# Patient Record
Sex: FEMALE | Race: WHITE | NOT HISPANIC OR LATINO | Employment: OTHER | URBAN - METROPOLITAN AREA
[De-identification: names, ages, dates, MRNs, and addresses within clinical notes are randomized per-mention and may not be internally consistent; named-entity substitution may affect disease eponyms.]

---

## 2018-08-13 ENCOUNTER — ANESTHESIA (OUTPATIENT)
Dept: PERIOP | Facility: HOSPITAL | Age: 78
End: 2018-08-13

## 2018-08-13 ENCOUNTER — HOSPITAL ENCOUNTER (OUTPATIENT)
Facility: HOSPITAL | Age: 78
Setting detail: HOSPITAL OUTPATIENT SURGERY
Discharge: HOME | End: 2018-08-13
Attending: SURGERY | Admitting: SURGERY

## 2018-08-13 ENCOUNTER — ANESTHESIA EVENT (OUTPATIENT)
Dept: PERIOP | Facility: HOSPITAL | Age: 78
End: 2018-08-13

## 2018-08-13 VITALS
RESPIRATION RATE: 16 BRPM | DIASTOLIC BLOOD PRESSURE: 41 MMHG | OXYGEN SATURATION: 99 % | TEMPERATURE: 97.1 F | HEART RATE: 73 BPM | HEIGHT: 63 IN | SYSTOLIC BLOOD PRESSURE: 163 MMHG | BODY MASS INDEX: 38.27 KG/M2 | WEIGHT: 216 LBS

## 2018-08-13 LAB
ANION GAP SERPL CALCULATED.3IONS-SCNC: 13 MMOL/L (ref 3–11)
BUN BLD-MCNC: 73 MG/DL (ref 9–23)
BUN/CREAT SERPL: 18.6 (ref 7–25)
CALCIUM SPEC-SCNC: 9.2 MG/DL (ref 8.7–10.4)
CHLORIDE SERPL-SCNC: 108 MMOL/L (ref 99–109)
CO2 SERPL-SCNC: 18 MMOL/L (ref 20–31)
CREAT BLD-MCNC: 3.92 MG/DL (ref 0.6–1.3)
DEPRECATED RDW RBC AUTO: 41.6 FL (ref 37–54)
ERYTHROCYTE [DISTWIDTH] IN BLOOD BY AUTOMATED COUNT: 12.2 % (ref 11.3–14.5)
GFR SERPL CREATININE-BSD FRML MDRD: 11 ML/MIN/1.73
GLUCOSE BLD-MCNC: 101 MG/DL (ref 70–100)
GLUCOSE BLDC GLUCOMTR-MCNC: 103 MG/DL (ref 70–130)
HCT VFR BLD AUTO: 35.6 % (ref 34.5–44)
HGB BLD-MCNC: 11.7 G/DL (ref 11.5–15.5)
MCH RBC QN AUTO: 31 PG (ref 27–31)
MCHC RBC AUTO-ENTMCNC: 32.9 G/DL (ref 32–36)
MCV RBC AUTO: 94.4 FL (ref 80–99)
PLATELET # BLD AUTO: 252 10*3/MM3 (ref 150–450)
PMV BLD AUTO: 10.1 FL (ref 6–12)
POTASSIUM BLD-SCNC: 4.1 MMOL/L (ref 3.5–5.5)
RBC # BLD AUTO: 3.77 10*6/MM3 (ref 3.89–5.14)
SODIUM BLD-SCNC: 139 MMOL/L (ref 132–146)
WBC NRBC COR # BLD: 6.58 10*3/MM3 (ref 3.5–10.8)

## 2018-08-13 PROCEDURE — 25010000002 HEPARIN (PORCINE) PER 1000 UNITS: Performed by: SURGERY

## 2018-08-13 PROCEDURE — 82962 GLUCOSE BLOOD TEST: CPT

## 2018-08-13 PROCEDURE — 25010000002 PROPOFOL 10 MG/ML EMULSION: Performed by: NURSE ANESTHETIST, CERTIFIED REGISTERED

## 2018-08-13 PROCEDURE — C1750 CATH, HEMODIALYSIS,LONG-TERM: HCPCS | Performed by: SURGERY

## 2018-08-13 PROCEDURE — 25010000002 FENTANYL CITRATE (PF) 100 MCG/2ML SOLUTION: Performed by: NURSE ANESTHETIST, CERTIFIED REGISTERED

## 2018-08-13 PROCEDURE — 93010 ELECTROCARDIOGRAM REPORT: CPT | Performed by: INTERNAL MEDICINE

## 2018-08-13 PROCEDURE — 25010000002 NEOSTIGMINE 10 MG/10ML SOLUTION: Performed by: NURSE ANESTHETIST, CERTIFIED REGISTERED

## 2018-08-13 PROCEDURE — 80048 BASIC METABOLIC PNL TOTAL CA: CPT | Performed by: ANESTHESIOLOGY

## 2018-08-13 PROCEDURE — 85027 COMPLETE CBC AUTOMATED: CPT | Performed by: ANESTHESIOLOGY

## 2018-08-13 PROCEDURE — 93005 ELECTROCARDIOGRAM TRACING: CPT | Performed by: ANESTHESIOLOGY

## 2018-08-13 PROCEDURE — 25010000002 ONDANSETRON PER 1 MG: Performed by: NURSE ANESTHETIST, CERTIFIED REGISTERED

## 2018-08-13 PROCEDURE — 25010000002 DEXAMETHASONE PER 1 MG: Performed by: NURSE ANESTHETIST, CERTIFIED REGISTERED

## 2018-08-13 DEVICE — KT CATH DIAL PERITONEAL CURL 2CUFF 62CM: Type: IMPLANTABLE DEVICE | Site: PERITONEUM | Status: FUNCTIONAL

## 2018-08-13 RX ORDER — SODIUM CHLORIDE, SODIUM LACTATE, POTASSIUM CHLORIDE, CALCIUM CHLORIDE 600; 310; 30; 20 MG/100ML; MG/100ML; MG/100ML; MG/100ML
INJECTION, SOLUTION INTRAVENOUS CONTINUOUS PRN
Status: DISCONTINUED | OUTPATIENT
Start: 2018-08-13 | End: 2018-08-13

## 2018-08-13 RX ORDER — FENTANYL CITRATE 50 UG/ML
INJECTION, SOLUTION INTRAMUSCULAR; INTRAVENOUS AS NEEDED
Status: DISCONTINUED | OUTPATIENT
Start: 2018-08-13 | End: 2018-08-13 | Stop reason: SURG

## 2018-08-13 RX ORDER — ATRACURIUM BESYLATE 10 MG/ML
INJECTION, SOLUTION INTRAVENOUS AS NEEDED
Status: DISCONTINUED | OUTPATIENT
Start: 2018-08-13 | End: 2018-08-13 | Stop reason: SURG

## 2018-08-13 RX ORDER — MAGNESIUM HYDROXIDE 1200 MG/15ML
LIQUID ORAL AS NEEDED
Status: DISCONTINUED | OUTPATIENT
Start: 2018-08-13 | End: 2018-08-13 | Stop reason: HOSPADM

## 2018-08-13 RX ORDER — PROMETHAZINE HYDROCHLORIDE 25 MG/1
25 SUPPOSITORY RECTAL ONCE AS NEEDED
Status: DISCONTINUED | OUTPATIENT
Start: 2018-08-13 | End: 2018-08-13 | Stop reason: HOSPADM

## 2018-08-13 RX ORDER — SODIUM CHLORIDE 0.9 % (FLUSH) 0.9 %
1-10 SYRINGE (ML) INJECTION AS NEEDED
Status: DISCONTINUED | OUTPATIENT
Start: 2018-08-13 | End: 2018-08-13 | Stop reason: HOSPADM

## 2018-08-13 RX ORDER — LABETALOL HYDROCHLORIDE 5 MG/ML
5 INJECTION, SOLUTION INTRAVENOUS
Status: DISCONTINUED | OUTPATIENT
Start: 2018-08-13 | End: 2018-08-13 | Stop reason: HOSPADM

## 2018-08-13 RX ORDER — LIDOCAINE HYDROCHLORIDE 10 MG/ML
INJECTION, SOLUTION EPIDURAL; INFILTRATION; INTRACAUDAL; PERINEURAL AS NEEDED
Status: DISCONTINUED | OUTPATIENT
Start: 2018-08-13 | End: 2018-08-13 | Stop reason: SURG

## 2018-08-13 RX ORDER — PROMETHAZINE HYDROCHLORIDE 25 MG/1
25 TABLET ORAL ONCE AS NEEDED
Status: DISCONTINUED | OUTPATIENT
Start: 2018-08-13 | End: 2018-08-13 | Stop reason: HOSPADM

## 2018-08-13 RX ORDER — BUPIVACAINE HYDROCHLORIDE 5 MG/ML
INJECTION, SOLUTION PERINEURAL AS NEEDED
Status: DISCONTINUED | OUTPATIENT
Start: 2018-08-13 | End: 2018-08-13 | Stop reason: HOSPADM

## 2018-08-13 RX ORDER — ONDANSETRON 2 MG/ML
INJECTION INTRAMUSCULAR; INTRAVENOUS AS NEEDED
Status: DISCONTINUED | OUTPATIENT
Start: 2018-08-13 | End: 2018-08-13 | Stop reason: SURG

## 2018-08-13 RX ORDER — PROMETHAZINE HYDROCHLORIDE 25 MG/ML
6.25 INJECTION, SOLUTION INTRAMUSCULAR; INTRAVENOUS ONCE AS NEEDED
Status: DISCONTINUED | OUTPATIENT
Start: 2018-08-13 | End: 2018-08-13 | Stop reason: HOSPADM

## 2018-08-13 RX ORDER — FAMOTIDINE 10 MG/ML
20 INJECTION, SOLUTION INTRAVENOUS ONCE
Status: DISCONTINUED | OUTPATIENT
Start: 2018-08-13 | End: 2018-08-13

## 2018-08-13 RX ORDER — FAMOTIDINE 20 MG/1
20 TABLET, FILM COATED ORAL ONCE
Status: COMPLETED | OUTPATIENT
Start: 2018-08-13 | End: 2018-08-13

## 2018-08-13 RX ORDER — IPRATROPIUM BROMIDE AND ALBUTEROL SULFATE 2.5; .5 MG/3ML; MG/3ML
3 SOLUTION RESPIRATORY (INHALATION) ONCE AS NEEDED
Status: DISCONTINUED | OUTPATIENT
Start: 2018-08-13 | End: 2018-08-13 | Stop reason: HOSPADM

## 2018-08-13 RX ORDER — GLYCOPYRROLATE 0.2 MG/ML
INJECTION INTRAMUSCULAR; INTRAVENOUS AS NEEDED
Status: DISCONTINUED | OUTPATIENT
Start: 2018-08-13 | End: 2018-08-13 | Stop reason: SURG

## 2018-08-13 RX ORDER — AMLODIPINE BESYLATE 10 MG/1
10 TABLET ORAL DAILY
COMMUNITY

## 2018-08-13 RX ORDER — TRAMADOL HYDROCHLORIDE 50 MG/1
50 TABLET ORAL EVERY 6 HOURS PRN
Qty: 15 TABLET | Refills: 0 | Status: ON HOLD | OUTPATIENT
Start: 2018-08-13 | End: 2018-10-03

## 2018-08-13 RX ORDER — HYDROMORPHONE HYDROCHLORIDE 1 MG/ML
0.5 INJECTION, SOLUTION INTRAMUSCULAR; INTRAVENOUS; SUBCUTANEOUS
Status: DISCONTINUED | OUTPATIENT
Start: 2018-08-13 | End: 2018-08-13 | Stop reason: HOSPADM

## 2018-08-13 RX ORDER — SODIUM CHLORIDE, SODIUM LACTATE, POTASSIUM CHLORIDE, CALCIUM CHLORIDE 600; 310; 30; 20 MG/100ML; MG/100ML; MG/100ML; MG/100ML
9 INJECTION, SOLUTION INTRAVENOUS CONTINUOUS
Status: DISCONTINUED | OUTPATIENT
Start: 2018-08-13 | End: 2018-08-13

## 2018-08-13 RX ORDER — HYDROCODONE BITARTRATE AND ACETAMINOPHEN 7.5; 325 MG/1; MG/1
1 TABLET ORAL ONCE AS NEEDED
Status: DISCONTINUED | OUTPATIENT
Start: 2018-08-13 | End: 2018-08-13 | Stop reason: HOSPADM

## 2018-08-13 RX ORDER — CEFAZOLIN SODIUM 2 G/100ML
2 INJECTION, SOLUTION INTRAVENOUS ONCE
Status: DISCONTINUED | OUTPATIENT
Start: 2018-08-13 | End: 2018-08-13

## 2018-08-13 RX ORDER — ASPIRIN 81 MG/1
81 TABLET ORAL DAILY
COMMUNITY

## 2018-08-13 RX ORDER — DOXAZOSIN MESYLATE 4 MG/1
4 TABLET ORAL NIGHTLY
COMMUNITY

## 2018-08-13 RX ORDER — DEXAMETHASONE SODIUM PHOSPHATE 4 MG/ML
INJECTION, SOLUTION INTRA-ARTICULAR; INTRALESIONAL; INTRAMUSCULAR; INTRAVENOUS; SOFT TISSUE AS NEEDED
Status: DISCONTINUED | OUTPATIENT
Start: 2018-08-13 | End: 2018-08-13 | Stop reason: SURG

## 2018-08-13 RX ORDER — FENTANYL CITRATE 50 UG/ML
50 INJECTION, SOLUTION INTRAMUSCULAR; INTRAVENOUS
Status: DISCONTINUED | OUTPATIENT
Start: 2018-08-13 | End: 2018-08-13 | Stop reason: HOSPADM

## 2018-08-13 RX ORDER — HYDROCHLOROTHIAZIDE 25 MG/1
25 TABLET ORAL DAILY
COMMUNITY

## 2018-08-13 RX ORDER — LIDOCAINE HYDROCHLORIDE 10 MG/ML
0.5 INJECTION, SOLUTION EPIDURAL; INFILTRATION; INTRACAUDAL; PERINEURAL ONCE AS NEEDED
Status: COMPLETED | OUTPATIENT
Start: 2018-08-13 | End: 2018-08-13

## 2018-08-13 RX ORDER — NALOXONE HCL 0.4 MG/ML
0.4 VIAL (ML) INJECTION AS NEEDED
Status: DISCONTINUED | OUTPATIENT
Start: 2018-08-13 | End: 2018-08-13 | Stop reason: HOSPADM

## 2018-08-13 RX ORDER — FENOFIBRATE 145 MG/1
145 TABLET, COATED ORAL DAILY
COMMUNITY

## 2018-08-13 RX ORDER — ONDANSETRON 2 MG/ML
4 INJECTION INTRAMUSCULAR; INTRAVENOUS ONCE AS NEEDED
Status: DISCONTINUED | OUTPATIENT
Start: 2018-08-13 | End: 2018-08-13 | Stop reason: HOSPADM

## 2018-08-13 RX ORDER — HYDROXYCHLOROQUINE SULFATE 200 MG/1
200 TABLET, FILM COATED ORAL 2 TIMES DAILY
COMMUNITY

## 2018-08-13 RX ORDER — CLINDAMYCIN PHOSPHATE 600 MG/50ML
600 INJECTION INTRAVENOUS ONCE
Status: COMPLETED | OUTPATIENT
Start: 2018-08-13 | End: 2018-08-13

## 2018-08-13 RX ORDER — HYDROCODONE BITARTRATE AND ACETAMINOPHEN 5; 325 MG/1; MG/1
1 TABLET ORAL ONCE AS NEEDED
Status: DISCONTINUED | OUTPATIENT
Start: 2018-08-13 | End: 2018-08-13 | Stop reason: HOSPADM

## 2018-08-13 RX ORDER — PROPOFOL 10 MG/ML
VIAL (ML) INTRAVENOUS AS NEEDED
Status: DISCONTINUED | OUTPATIENT
Start: 2018-08-13 | End: 2018-08-13 | Stop reason: SURG

## 2018-08-13 RX ORDER — MEPERIDINE HYDROCHLORIDE 25 MG/ML
12.5 INJECTION INTRAMUSCULAR; INTRAVENOUS; SUBCUTANEOUS
Status: DISCONTINUED | OUTPATIENT
Start: 2018-08-13 | End: 2018-08-13 | Stop reason: HOSPADM

## 2018-08-13 RX ORDER — NEOSTIGMINE METHYLSULFATE 1 MG/ML
INJECTION, SOLUTION INTRAVENOUS AS NEEDED
Status: DISCONTINUED | OUTPATIENT
Start: 2018-08-13 | End: 2018-08-13 | Stop reason: SURG

## 2018-08-13 RX ORDER — SODIUM CHLORIDE 9 MG/ML
9 INJECTION, SOLUTION INTRAVENOUS CONTINUOUS
Status: DISCONTINUED | OUTPATIENT
Start: 2018-08-13 | End: 2018-08-13 | Stop reason: HOSPADM

## 2018-08-13 RX ADMIN — SODIUM CHLORIDE 9 ML/HR: 9 INJECTION, SOLUTION INTRAVENOUS at 11:14

## 2018-08-13 RX ADMIN — SODIUM CHLORIDE: 9 INJECTION, SOLUTION INTRAVENOUS at 12:34

## 2018-08-13 RX ADMIN — ONDANSETRON 4 MG: 2 INJECTION INTRAMUSCULAR; INTRAVENOUS at 12:55

## 2018-08-13 RX ADMIN — EPHEDRINE SULFATE 5 MG: 50 INJECTION INTRAMUSCULAR; INTRAVENOUS; SUBCUTANEOUS at 12:53

## 2018-08-13 RX ADMIN — GLYCOPYRROLATE 0.2 MG: 0.2 INJECTION, SOLUTION INTRAMUSCULAR; INTRAVENOUS at 13:04

## 2018-08-13 RX ADMIN — LIDOCAINE HYDROCHLORIDE 0.3 ML: 10 INJECTION, SOLUTION EPIDURAL; INFILTRATION; INTRACAUDAL; PERINEURAL at 11:14

## 2018-08-13 RX ADMIN — FENTANYL CITRATE 25 MCG: 50 INJECTION, SOLUTION INTRAMUSCULAR; INTRAVENOUS at 12:41

## 2018-08-13 RX ADMIN — DEXAMETHASONE SODIUM PHOSPHATE 4 MG: 4 INJECTION, SOLUTION INTRAMUSCULAR; INTRAVENOUS at 12:48

## 2018-08-13 RX ADMIN — GLYCOPYRROLATE 0.2 MG: 0.2 INJECTION, SOLUTION INTRAMUSCULAR; INTRAVENOUS at 12:54

## 2018-08-13 RX ADMIN — ATRACURIUM BESYLATE 30 MG: 10 INJECTION, SOLUTION INTRAVENOUS at 12:41

## 2018-08-13 RX ADMIN — PROPOFOL 120 MG: 10 INJECTION, EMULSION INTRAVENOUS at 12:41

## 2018-08-13 RX ADMIN — GLYCOPYRROLATE 0.2 MG: 0.2 INJECTION, SOLUTION INTRAMUSCULAR; INTRAVENOUS at 13:12

## 2018-08-13 RX ADMIN — LIDOCAINE HYDROCHLORIDE 30 MG: 10 INJECTION, SOLUTION EPIDURAL; INFILTRATION; INTRACAUDAL; PERINEURAL at 12:41

## 2018-08-13 RX ADMIN — NEOSTIGMINE METHYLSULFATE 2.5 MG: 1 INJECTION, SOLUTION INTRAVENOUS at 13:04

## 2018-08-13 RX ADMIN — FAMOTIDINE 20 MG: 20 TABLET ORAL at 11:14

## 2018-08-13 RX ADMIN — NEOSTIGMINE METHYLSULFATE 1 MG: 1 INJECTION, SOLUTION INTRAVENOUS at 13:12

## 2018-08-13 RX ADMIN — CLINDAMYCIN PHOSPHATE 600 MG: 12 INJECTION, SOLUTION INTRAVENOUS at 12:44

## 2018-08-13 NOTE — H&P
"  Patient Care Team:      Chief complaint  Preparing for dialysis in the future    Subjective:    Patient is a 78 y.o.female presents for peritoneal dialysis catheter.  She has not been on dialysis yet.     Review of Systems:  General ROS: has had pedal edema, had previous foot surgery, both legs swell throughout the day  Cardiovascular ROS: positive for - dyspnea on exertion, some chest tightness with walking, is unable to walk much due to back pain and chest tightness  Respiratory ROS: positive for - shortness of breath      Allergies:   Allergies   Allergen Reactions   • Penicillins Anaphylaxis   • Sulfa Antibiotics Unknown (See Comments)     PT STATES IT \"MAKES HER SICK\"           Latex: neg neg  Contrast Dyeneg    Home Meds    Prescriptions Prior to Admission   Medication Sig Dispense Refill Last Dose   • amLODIPine (NORVASC) 10 MG tablet Take 10 mg by mouth Daily.   8/12/2018 at 1000   • aspirin 81 MG EC tablet Take 81 mg by mouth Daily.   8/13/2018 at 0530   • Cholecalciferol (VITAMIN D3) 5000 units capsule capsule Take 5,000 Units by mouth Daily.   8/12/2018 at 1000   • doxazosin (CARDURA) 4 MG tablet Take 4 mg by mouth Every Night.   8/12/2018 at 1800   • estrogens, conjugated, (PREMARIN) 0.625 MG tablet Take 0.625 mg by mouth Daily. Take daily for 21 days then do not take for 7 days.   8/12/2018 at 1000   • fenofibrate (TRICOR) 145 MG tablet Take 145 mg by mouth Daily.   8/12/2018 at 1000   • hydrochlorothiazide (HYDRODIURIL) 25 MG tablet Take 25 mg by mouth Daily.   8/12/2018 at 1000   • hydroxychloroquine (PLAQUENIL) 200 MG tablet Take 200 mg by mouth 2 (Two) Times a Day.   8/12/2018 at 1800   • insulin regular (humuLIN R,novoLIN R) 100 UNIT/ML injection Inject  under the skin into the appropriate area as directed 3 (Three) Times a Day Before Meals.        PMH:   Past Medical History:   Diagnosis Date   • Diabetes mellitus (CMS/HCC)    • Hypertension      PSH:    Past Surgical History:   Procedure " "Laterality Date   • ANKLE FUSION     • CATARACT EXTRACTION     • HYSTERECTOMY       Immunization History: pneumo up to date   Flu  2017  Tetanus  allergic  Social History:   Tobacco quit 50 years   Alcohol neg      Physical Exam:BP (!) 182/65 (BP Location: Right arm, Patient Position: Lying)   Pulse 69   Temp 97.8 °F (36.6 °C) (Temporal Artery )   Resp 18   Ht 160 cm (63\")   Wt 98 kg (216 lb)   SpO2 99%   BMI 38.26 kg/m²       General Appearance:    Alert, cooperative, no distress, appears stated age   Head:    Normocephalic, without obvious abnormality, atraumatic   Lungs:     Clear to auscultation bilaterally, respirations unlabored    Heart: Regular rate and rhythm, S1 and S2 normal, 2/6 systolic murmur, cannot appreciate radiation to neck    Abdomen:    Soft without tenderness   Breast Exam:    deferred   Genitalia:    deferred   Extremities:   Extremities normal, atraumatic, no cyanosis or edema   Skin:   Thickened skin over lower legs with pitting edema   Neurologic:   Grossly intact     Results Review: BUN  73  Creatinine 3.92        Impression: renal failure    Plan: lap peritoneal dialysis catheter placement  Chloe Horvath PA-C 8/13/2018 11:41 AM        "

## 2018-08-13 NOTE — ANESTHESIA PROCEDURE NOTES
Airway  Urgency: elective    Airway not difficult    General Information and Staff    Patient location during procedure: OR  CRNA: MADELIN ARAUJO    Indications and Patient Condition  Indications for airway management: airway protection    Preoxygenated: yes  MILS not maintained throughout  Mask difficulty assessment: 1 - vent by mask    Final Airway Details  Final airway type: endotracheal airway      Successful airway: ETT  Cuffed: yes   Successful intubation technique: direct laryngoscopy  Endotracheal tube insertion site: oral  Blade: Marine  Blade size: #3  ETT size: 7.0 mm  Cormack-Lehane Classification: grade IIa - partial view of glottis  Placement verified by: chest auscultation and capnometry   Measured from: lips  ETT to lips (cm): 20  Number of attempts at approach: 1    Additional Comments  Negative epigastric sounds, Breath sound equal bilaterally with symmetric chest rise and fall

## 2018-08-13 NOTE — ANESTHESIA PREPROCEDURE EVALUATION
Anesthesia Evaluation     Patient summary reviewed and Nursing notes reviewed                Airway   Mallampati: I  TM distance: >3 FB  Neck ROM: full  No difficulty expected  Dental      Pulmonary - negative pulmonary ROS   Cardiovascular     ECG reviewed    (+) hypertension,       Neuro/Psych- negative ROS  GI/Hepatic/Renal/Endo    (+)   renal disease, diabetes mellitus,     Musculoskeletal (-) negative ROS    Abdominal    Substance History - negative use     OB/GYN negative ob/gyn ROS         Other                        Anesthesia Plan    ASA 3     general     intravenous induction     Plan discussed with CRNA.

## 2018-08-13 NOTE — ANESTHESIA POSTPROCEDURE EVALUATION
Patient: Cleo Hobbs    Procedure Summary     Date:  08/13/18 Room / Location:   [facility] / [facility]    Anesthesia Start:  1234 Anesthesia Stop:  1331    Procedure:  LAPAROSCOPIC PERITONEAL DIALYSIS CATHETER PLACEMENT (N/A Abdomen) Diagnosis:      Surgeon:  Gonzalez Sauceda MD Provider:  Jey Bond MD    Anesthesia Type:  general ASA Status:  3          Anesthesia Type: general  Last vitals  BP   185/77   Temp 97   Pulse 99   Resp 24   SpO2 100%     Post Anesthesia Care and Evaluation    Patient location during evaluation: PACU  Patient participation: complete - patient participated  Level of consciousness: awake  Pain score: 0  Pain management: adequate  Airway patency: patent  Anesthetic complications: No anesthetic complications  PONV Status: none  Cardiovascular status: hemodynamically stable and acceptable  Respiratory status: nonlabored ventilation, acceptable and nasal cannula  Hydration status: acceptable

## 2018-08-13 NOTE — OP NOTE
General Surgery Operative Note    Cleo Hobbs  [id number]  1940    Date of Surgery:  8/13/2018 1:14 PM    Pre-op Diagnosis: Chronic renal failure    Post-op Diagnosis: Chronic renal failure      Procedure: Laparoscopic peritoneal dialysis catheter placement, 62 cm Tenckhoff    Procedure(s):  LAPAROSCOPIC PERITONEAL DIALYSIS CATHETER PLACEMENT    Surgeon: Gonzalez Sauceda MD  Assistant: None    Anesthesia: General    Fluids: 550 mL crystalloid    Estimated Blood Loss: Less than 25 mL    Urine Voided: Not recorded    Implant: 62 cm pigtail peritoneal dialysis catheter    Complications: None apparent    History:   78-year-old lady with chronic renal failure requiring dialysis access for the future institution of dialysis.       The risks and benefits of laparoscopic peritoneal dialysis catheter placement were rehashed.  Our discussion included but was not limited to: bleeding, infection, injury to adjacent viscera (spleen, stomach, colon), internal hernia formation, an open operation in general, nonfunction, need for repositioning, and medical issues from a cardiopulmonary and deep venous thrombosis standpoint.  All questions were answered and they understand and wish to proceed with surgical intervention.    Procedure:      After informed consent the patient was taken to the operating room and placed in the supine position.  General anesthesia was induced, the abdomen was then prepped and draped in the standard sterile fashion.  An Ioban was placed on the skin.  A timeout was observed.     The operation began at the left subcostal margin.  10 cm from the xiphoid a stab incision was created and I accessed the peritoneal cavity under direct visualization with a 5 mm Optiview.  The abdomen was then insufflated.  Under direct visualization to the right rectus muscle the introducer needle was placed into the abdominal cavity.  Through the introducer needle the 035 guidewire was placed down into the pelvis this  tract was subsequently dilated and then the pigtail catheter was placed through the peel-away sheath which was removed.  The catheter itself was positioned in the patient's pelvis and flushed well.  The catheter was then drawn through a subcutaneous tunnel through a separate stab incision.  The distal Damon ring was placed at the level of the peritoneum. The transfer tubing was then attached to the catheter itself.  The catheter flushed well and was instilled with heparinized saline.  The catheter entry site was closed with 3-0 Vicryl.  All subcutaneous tights were reapproximated with interrupted 4-0 Monocryl.   Sterile dressings were placed on the wounds.  All lap and needle counts were correct at the end of the procedure ×2.  The patient was then transferred to the PACU in stable condition.    Gonzalez Sauceda MD     Date: 8/13/2018  Time: 1:14 PM

## 2018-10-02 ENCOUNTER — ANESTHESIA EVENT (OUTPATIENT)
Dept: PERIOP | Facility: HOSPITAL | Age: 78
End: 2018-10-02

## 2018-10-03 ENCOUNTER — ANESTHESIA (OUTPATIENT)
Dept: PERIOP | Facility: HOSPITAL | Age: 78
End: 2018-10-03

## 2018-10-03 ENCOUNTER — HOSPITAL ENCOUNTER (INPATIENT)
Facility: HOSPITAL | Age: 78
LOS: 5 days | Discharge: HOME | End: 2018-10-08
Attending: SURGERY | Admitting: SURGERY

## 2018-10-03 DIAGNOSIS — Z74.09 IMPAIRED FUNCTIONAL MOBILITY, BALANCE, GAIT, AND ENDURANCE: Primary | ICD-10-CM

## 2018-10-03 DIAGNOSIS — K65.9 PERITONITIS DUE TO INFECTED PERITONEAL DIALYSIS CATHETER, SUBSEQUENT ENCOUNTER (HCC): ICD-10-CM

## 2018-10-03 DIAGNOSIS — B99.9 INFECTION: ICD-10-CM

## 2018-10-03 DIAGNOSIS — T85.71XD PERITONITIS DUE TO INFECTED PERITONEAL DIALYSIS CATHETER, SUBSEQUENT ENCOUNTER (HCC): ICD-10-CM

## 2018-10-03 PROBLEM — E11.9 TYPE 2 DIABETES MELLITUS (HCC): Status: ACTIVE | Noted: 2018-10-03

## 2018-10-03 PROBLEM — N18.4 STAGE 4 CHRONIC KIDNEY DISEASE (HCC): Status: ACTIVE | Noted: 2018-10-03

## 2018-10-03 PROBLEM — E66.01 MORBID OBESITY (HCC): Status: ACTIVE | Noted: 2018-10-03

## 2018-10-03 PROBLEM — E11.21 DIABETIC NEPHROPATHIES (HCC): Status: ACTIVE | Noted: 2018-10-03

## 2018-10-03 PROBLEM — D63.8 ANEMIA OF CHRONIC DISEASE: Status: ACTIVE | Noted: 2018-10-03

## 2018-10-03 PROBLEM — T85.71XA PERITONITIS DUE TO INFECTED PERITONEAL DIALYSIS CATHETER (HCC): Status: ACTIVE | Noted: 2018-10-03

## 2018-10-03 PROBLEM — I87.8 VENOUS STASIS: Status: ACTIVE | Noted: 2018-10-03

## 2018-10-03 PROBLEM — I10 HTN (HYPERTENSION): Status: ACTIVE | Noted: 2018-10-03

## 2018-10-03 LAB
ALBUMIN SERPL-MCNC: 3.6 G/DL (ref 3.2–4.8)
ALBUMIN/GLOB SERPL: 1.1 G/DL (ref 1.5–2.5)
ALP SERPL-CCNC: 41 U/L (ref 25–100)
ALT SERPL W P-5'-P-CCNC: 30 U/L (ref 7–40)
ANION GAP SERPL CALCULATED.3IONS-SCNC: 11 MMOL/L (ref 3–11)
AST SERPL-CCNC: 44 U/L (ref 0–33)
BILIRUB SERPL-MCNC: 0.5 MG/DL (ref 0.3–1.2)
BUN BLD-MCNC: 59 MG/DL (ref 9–23)
BUN/CREAT SERPL: 15.9 (ref 7–25)
CALCIUM SPEC-SCNC: 9.1 MG/DL (ref 8.7–10.4)
CHLORIDE SERPL-SCNC: 110 MMOL/L (ref 99–109)
CO2 SERPL-SCNC: 18 MMOL/L (ref 20–31)
CREAT BLD-MCNC: 3.7 MG/DL (ref 0.6–1.3)
DEPRECATED RDW RBC AUTO: 42.6 FL (ref 37–54)
ERYTHROCYTE [DISTWIDTH] IN BLOOD BY AUTOMATED COUNT: 12.5 % (ref 11.3–14.5)
FERRITIN SERPL-MCNC: 654 NG/ML (ref 10–291)
FOLATE SERPL-MCNC: 8.3 NG/ML (ref 3.2–20)
GFR SERPL CREATININE-BSD FRML MDRD: 12 ML/MIN/1.73
GLOBULIN UR ELPH-MCNC: 3.3 GM/DL
GLUCOSE BLD-MCNC: 74 MG/DL (ref 70–100)
GLUCOSE BLDC GLUCOMTR-MCNC: 115 MG/DL (ref 70–130)
GLUCOSE BLDC GLUCOMTR-MCNC: 79 MG/DL (ref 70–130)
GLUCOSE BLDC GLUCOMTR-MCNC: 96 MG/DL (ref 70–130)
HCT VFR BLD AUTO: 27.3 % (ref 34.5–44)
HGB BLD-MCNC: 8.8 G/DL (ref 11.5–15.5)
IRON 24H UR-MRATE: 38 MCG/DL (ref 50–175)
IRON SATN MFR SERPL: 13 % (ref 15–50)
MCH RBC QN AUTO: 29.9 PG (ref 27–31)
MCHC RBC AUTO-ENTMCNC: 32.2 G/DL (ref 32–36)
MCV RBC AUTO: 92.9 FL (ref 80–99)
PLATELET # BLD AUTO: 283 10*3/MM3 (ref 150–450)
PMV BLD AUTO: 10.3 FL (ref 6–12)
POTASSIUM BLD-SCNC: 3.6 MMOL/L (ref 3.5–5.5)
PROT SERPL-MCNC: 6.9 G/DL (ref 5.7–8.2)
RBC # BLD AUTO: 2.94 10*6/MM3 (ref 3.89–5.14)
RETICS/RBC NFR AUTO: 1.51 % (ref 0.5–1.5)
SODIUM BLD-SCNC: 139 MMOL/L (ref 132–146)
TIBC SERPL-MCNC: 301 MCG/DL (ref 250–450)
VIT B12 BLD-MCNC: 614 PG/ML (ref 211–911)
WBC NRBC COR # BLD: 9.31 10*3/MM3 (ref 3.5–10.8)

## 2018-10-03 PROCEDURE — 82728 ASSAY OF FERRITIN: CPT | Performed by: HOSPITALIST

## 2018-10-03 PROCEDURE — 85027 COMPLETE CBC AUTOMATED: CPT | Performed by: SURGERY

## 2018-10-03 PROCEDURE — G0378 HOSPITAL OBSERVATION PER HR: HCPCS

## 2018-10-03 PROCEDURE — 94002 VENT MGMT INPAT INIT DAY: CPT

## 2018-10-03 PROCEDURE — 83550 IRON BINDING TEST: CPT | Performed by: HOSPITALIST

## 2018-10-03 PROCEDURE — 87070 CULTURE OTHR SPECIMN AEROBIC: CPT | Performed by: SURGERY

## 2018-10-03 PROCEDURE — 82607 VITAMIN B-12: CPT | Performed by: HOSPITALIST

## 2018-10-03 PROCEDURE — 80053 COMPREHEN METABOLIC PANEL: CPT | Performed by: SURGERY

## 2018-10-03 PROCEDURE — 87077 CULTURE AEROBIC IDENTIFY: CPT | Performed by: INTERNAL MEDICINE

## 2018-10-03 PROCEDURE — 87102 FUNGUS ISOLATION CULTURE: CPT | Performed by: SURGERY

## 2018-10-03 PROCEDURE — 87116 MYCOBACTERIA CULTURE: CPT | Performed by: SURGERY

## 2018-10-03 PROCEDURE — 85045 AUTOMATED RETICULOCYTE COUNT: CPT | Performed by: HOSPITALIST

## 2018-10-03 PROCEDURE — 25010000002 HEPARIN (PORCINE) PER 1000 UNITS: Performed by: SURGERY

## 2018-10-03 PROCEDURE — 25010000002 NEOSTIGMINE PER 0.5 MG: Performed by: NURSE ANESTHETIST, CERTIFIED REGISTERED

## 2018-10-03 PROCEDURE — 87181 SC STD AGAR DILUTION PER AGT: CPT

## 2018-10-03 PROCEDURE — 99220 PR INITIAL OBSERVATION CARE/DAY 70 MINUTES: CPT | Performed by: HOSPITALIST

## 2018-10-03 PROCEDURE — 25010000002 PROPOFOL 10 MG/ML EMULSION: Performed by: NURSE ANESTHETIST, CERTIFIED REGISTERED

## 2018-10-03 PROCEDURE — 94799 UNLISTED PULMONARY SVC/PX: CPT

## 2018-10-03 PROCEDURE — 87075 CULTR BACTERIA EXCEPT BLOOD: CPT | Performed by: SURGERY

## 2018-10-03 PROCEDURE — 82746 ASSAY OF FOLIC ACID SERUM: CPT | Performed by: HOSPITALIST

## 2018-10-03 PROCEDURE — 25010000002 FENTANYL CITRATE (PF) 100 MCG/2ML SOLUTION: Performed by: NURSE ANESTHETIST, CERTIFIED REGISTERED

## 2018-10-03 PROCEDURE — 82962 GLUCOSE BLOOD TEST: CPT

## 2018-10-03 PROCEDURE — 87206 SMEAR FLUORESCENT/ACID STAI: CPT | Performed by: SURGERY

## 2018-10-03 PROCEDURE — 0WPG03Z REMOVAL OF INFUSION DEVICE FROM PERITONEAL CAVITY, OPEN APPROACH: ICD-10-PCS | Performed by: SURGERY

## 2018-10-03 PROCEDURE — 83540 ASSAY OF IRON: CPT | Performed by: HOSPITALIST

## 2018-10-03 PROCEDURE — 87205 SMEAR GRAM STAIN: CPT | Performed by: SURGERY

## 2018-10-03 RX ORDER — CLINDAMYCIN PHOSPHATE 600 MG/50ML
600 INJECTION INTRAVENOUS EVERY 8 HOURS
Status: DISCONTINUED | OUTPATIENT
Start: 2018-10-03 | End: 2018-10-03

## 2018-10-03 RX ORDER — LIDOCAINE HYDROCHLORIDE 10 MG/ML
INJECTION, SOLUTION INFILTRATION; PERINEURAL AS NEEDED
Status: DISCONTINUED | OUTPATIENT
Start: 2018-10-03 | End: 2018-10-03 | Stop reason: SURG

## 2018-10-03 RX ORDER — ONDANSETRON 4 MG/1
4 TABLET, FILM COATED ORAL EVERY 6 HOURS PRN
Status: DISCONTINUED | OUTPATIENT
Start: 2018-10-03 | End: 2018-10-08 | Stop reason: HOSPADM

## 2018-10-03 RX ORDER — SODIUM CHLORIDE 0.9 % (FLUSH) 0.9 %
3-10 SYRINGE (ML) INJECTION AS NEEDED
Status: DISCONTINUED | OUTPATIENT
Start: 2018-10-03 | End: 2018-10-03 | Stop reason: HOSPADM

## 2018-10-03 RX ORDER — GLYCOPYRROLATE 0.2 MG/ML
INJECTION INTRAMUSCULAR; INTRAVENOUS AS NEEDED
Status: DISCONTINUED | OUTPATIENT
Start: 2018-10-03 | End: 2018-10-03 | Stop reason: SURG

## 2018-10-03 RX ORDER — ATRACURIUM BESYLATE 10 MG/ML
INJECTION, SOLUTION INTRAVENOUS AS NEEDED
Status: DISCONTINUED | OUTPATIENT
Start: 2018-10-03 | End: 2018-10-03 | Stop reason: SURG

## 2018-10-03 RX ORDER — DEXTROSE MONOHYDRATE 25 G/50ML
25 INJECTION, SOLUTION INTRAVENOUS
Status: DISCONTINUED | OUTPATIENT
Start: 2018-10-03 | End: 2018-10-03 | Stop reason: SDUPTHER

## 2018-10-03 RX ORDER — HYDROCHLOROTHIAZIDE 25 MG/1
25 TABLET ORAL DAILY
Status: DISCONTINUED | OUTPATIENT
Start: 2018-10-03 | End: 2018-10-08 | Stop reason: HOSPADM

## 2018-10-03 RX ORDER — FENTANYL CITRATE 50 UG/ML
25 INJECTION, SOLUTION INTRAMUSCULAR; INTRAVENOUS
Status: DISCONTINUED | OUTPATIENT
Start: 2018-10-03 | End: 2018-10-03

## 2018-10-03 RX ORDER — DEXTROSE MONOHYDRATE 25 G/50ML
25 INJECTION, SOLUTION INTRAVENOUS
Status: DISCONTINUED | OUTPATIENT
Start: 2018-10-03 | End: 2018-10-08 | Stop reason: HOSPADM

## 2018-10-03 RX ORDER — BUPIVACAINE HYDROCHLORIDE 5 MG/ML
INJECTION, SOLUTION PERINEURAL AS NEEDED
Status: DISCONTINUED | OUTPATIENT
Start: 2018-10-03 | End: 2018-10-03 | Stop reason: HOSPADM

## 2018-10-03 RX ORDER — NALOXONE HCL 0.4 MG/ML
0.4 VIAL (ML) INJECTION
Status: DISCONTINUED | OUTPATIENT
Start: 2018-10-03 | End: 2018-10-08 | Stop reason: HOSPADM

## 2018-10-03 RX ORDER — SODIUM CHLORIDE, SODIUM LACTATE, POTASSIUM CHLORIDE, CALCIUM CHLORIDE 600; 310; 30; 20 MG/100ML; MG/100ML; MG/100ML; MG/100ML
9 INJECTION, SOLUTION INTRAVENOUS CONTINUOUS
Status: DISCONTINUED | OUTPATIENT
Start: 2018-10-03 | End: 2018-10-03

## 2018-10-03 RX ORDER — HYDROCODONE BITARTRATE AND ACETAMINOPHEN 5; 325 MG/1; MG/1
1 TABLET ORAL EVERY 4 HOURS PRN
Status: DISCONTINUED | OUTPATIENT
Start: 2018-10-03 | End: 2018-10-08 | Stop reason: HOSPADM

## 2018-10-03 RX ORDER — NICOTINE POLACRILEX 4 MG
15 LOZENGE BUCCAL
Status: DISCONTINUED | OUTPATIENT
Start: 2018-10-03 | End: 2018-10-08 | Stop reason: HOSPADM

## 2018-10-03 RX ORDER — ESMOLOL HYDROCHLORIDE 10 MG/ML
INJECTION INTRAVENOUS AS NEEDED
Status: DISCONTINUED | OUTPATIENT
Start: 2018-10-03 | End: 2018-10-03 | Stop reason: SURG

## 2018-10-03 RX ORDER — HYDROXYCHLOROQUINE SULFATE 200 MG/1
200 TABLET, FILM COATED ORAL 2 TIMES DAILY
Status: DISCONTINUED | OUTPATIENT
Start: 2018-10-03 | End: 2018-10-08 | Stop reason: HOSPADM

## 2018-10-03 RX ORDER — HEPARIN SODIUM 5000 [USP'U]/ML
5000 INJECTION, SOLUTION INTRAVENOUS; SUBCUTANEOUS EVERY 8 HOURS SCHEDULED
Status: DISCONTINUED | OUTPATIENT
Start: 2018-10-04 | End: 2018-10-08 | Stop reason: HOSPADM

## 2018-10-03 RX ORDER — ACETAMINOPHEN 325 MG/1
650 TABLET ORAL EVERY 4 HOURS PRN
Status: DISCONTINUED | OUTPATIENT
Start: 2018-10-03 | End: 2018-10-08 | Stop reason: HOSPADM

## 2018-10-03 RX ORDER — FENTANYL CITRATE 50 UG/ML
INJECTION, SOLUTION INTRAMUSCULAR; INTRAVENOUS AS NEEDED
Status: DISCONTINUED | OUTPATIENT
Start: 2018-10-03 | End: 2018-10-03 | Stop reason: SURG

## 2018-10-03 RX ORDER — FAMOTIDINE 20 MG/1
20 TABLET, FILM COATED ORAL ONCE
Status: COMPLETED | OUTPATIENT
Start: 2018-10-03 | End: 2018-10-03

## 2018-10-03 RX ORDER — CLINDAMYCIN PHOSPHATE 600 MG/50ML
600 INJECTION INTRAVENOUS EVERY 8 HOURS
Status: COMPLETED | OUTPATIENT
Start: 2018-10-03 | End: 2018-10-04

## 2018-10-03 RX ORDER — ACETAMINOPHEN 160 MG/5ML
650 SOLUTION ORAL EVERY 4 HOURS PRN
Status: DISCONTINUED | OUTPATIENT
Start: 2018-10-03 | End: 2018-10-08 | Stop reason: HOSPADM

## 2018-10-03 RX ORDER — AMLODIPINE BESYLATE 10 MG/1
10 TABLET ORAL DAILY
Status: DISCONTINUED | OUTPATIENT
Start: 2018-10-03 | End: 2018-10-08 | Stop reason: HOSPADM

## 2018-10-03 RX ORDER — NICOTINE POLACRILEX 4 MG
15 LOZENGE BUCCAL
Status: DISCONTINUED | OUTPATIENT
Start: 2018-10-03 | End: 2018-10-03 | Stop reason: SDUPTHER

## 2018-10-03 RX ORDER — TERAZOSIN 5 MG/1
5 CAPSULE ORAL NIGHTLY
Status: DISCONTINUED | OUTPATIENT
Start: 2018-10-03 | End: 2018-10-08 | Stop reason: HOSPADM

## 2018-10-03 RX ORDER — ACETAMINOPHEN 650 MG/1
650 SUPPOSITORY RECTAL EVERY 4 HOURS PRN
Status: DISCONTINUED | OUTPATIENT
Start: 2018-10-03 | End: 2018-10-08 | Stop reason: HOSPADM

## 2018-10-03 RX ORDER — FAMOTIDINE 10 MG/ML
20 INJECTION, SOLUTION INTRAVENOUS ONCE
Status: DISCONTINUED | OUTPATIENT
Start: 2018-10-03 | End: 2018-10-03

## 2018-10-03 RX ORDER — LIDOCAINE HYDROCHLORIDE 10 MG/ML
0.5 INJECTION, SOLUTION EPIDURAL; INFILTRATION; INTRACAUDAL; PERINEURAL ONCE AS NEEDED
Status: COMPLETED | OUTPATIENT
Start: 2018-10-03 | End: 2018-10-03

## 2018-10-03 RX ORDER — FAMOTIDINE 20 MG/1
20 TABLET, FILM COATED ORAL DAILY
Status: DISCONTINUED | OUTPATIENT
Start: 2018-10-03 | End: 2018-10-08 | Stop reason: HOSPADM

## 2018-10-03 RX ORDER — PROPOFOL 10 MG/ML
VIAL (ML) INTRAVENOUS AS NEEDED
Status: DISCONTINUED | OUTPATIENT
Start: 2018-10-03 | End: 2018-10-03 | Stop reason: SURG

## 2018-10-03 RX ORDER — DOCUSATE SODIUM 100 MG/1
100 CAPSULE, LIQUID FILLED ORAL 2 TIMES DAILY PRN
Status: DISCONTINUED | OUTPATIENT
Start: 2018-10-03 | End: 2018-10-08 | Stop reason: HOSPADM

## 2018-10-03 RX ORDER — SODIUM CHLORIDE 9 MG/ML
INJECTION, SOLUTION INTRAVENOUS CONTINUOUS PRN
Status: DISCONTINUED | OUTPATIENT
Start: 2018-10-03 | End: 2018-10-03 | Stop reason: SURG

## 2018-10-03 RX ORDER — ONDANSETRON 2 MG/ML
4 INJECTION INTRAMUSCULAR; INTRAVENOUS EVERY 6 HOURS PRN
Status: DISCONTINUED | OUTPATIENT
Start: 2018-10-03 | End: 2018-10-08 | Stop reason: HOSPADM

## 2018-10-03 RX ORDER — MORPHINE SULFATE 4 MG/ML
2 INJECTION, SOLUTION INTRAMUSCULAR; INTRAVENOUS
Status: DISCONTINUED | OUTPATIENT
Start: 2018-10-03 | End: 2018-10-08 | Stop reason: HOSPADM

## 2018-10-03 RX ORDER — ASPIRIN 81 MG/1
81 TABLET ORAL DAILY
Status: DISCONTINUED | OUTPATIENT
Start: 2018-10-04 | End: 2018-10-08 | Stop reason: HOSPADM

## 2018-10-03 RX ORDER — CLINDAMYCIN PHOSPHATE 600 MG/50ML
600 INJECTION INTRAVENOUS ONCE
Status: DISCONTINUED | OUTPATIENT
Start: 2018-10-03 | End: 2018-10-03 | Stop reason: HOSPADM

## 2018-10-03 RX ORDER — SODIUM CHLORIDE 0.9 % (FLUSH) 0.9 %
3 SYRINGE (ML) INJECTION EVERY 12 HOURS SCHEDULED
Status: DISCONTINUED | OUTPATIENT
Start: 2018-10-03 | End: 2018-10-03 | Stop reason: HOSPADM

## 2018-10-03 RX ADMIN — LIDOCAINE HYDROCHLORIDE 0.2 ML: 10 INJECTION, SOLUTION EPIDURAL; INFILTRATION; INTRACAUDAL; PERINEURAL at 10:45

## 2018-10-03 RX ADMIN — SODIUM CHLORIDE: 9 INJECTION, SOLUTION INTRAVENOUS at 13:08

## 2018-10-03 RX ADMIN — FAMOTIDINE 20 MG: 20 TABLET ORAL at 18:44

## 2018-10-03 RX ADMIN — TERAZOSIN HYDROCHLORIDE 5 MG: 5 CAPSULE ORAL at 20:06

## 2018-10-03 RX ADMIN — ATRACURIUM BESYLATE 25 MG: 10 INJECTION, SOLUTION INTRAVENOUS at 14:48

## 2018-10-03 RX ADMIN — CLINDAMYCIN PHOSPHATE 600 MG: 600 INJECTION, SOLUTION INTRAVENOUS at 22:29

## 2018-10-03 RX ADMIN — GLYCOPYRROLATE 0.4 MG: 0.2 INJECTION, SOLUTION INTRAMUSCULAR; INTRAVENOUS at 15:15

## 2018-10-03 RX ADMIN — ESMOLOL HYDROCHLORIDE 5 MG: 10 INJECTION INTRAVENOUS at 15:15

## 2018-10-03 RX ADMIN — Medication 1 MG: at 15:18

## 2018-10-03 RX ADMIN — AMLODIPINE BESYLATE 10 MG: 10 TABLET ORAL at 18:44

## 2018-10-03 RX ADMIN — Medication 1 MG: at 15:22

## 2018-10-03 RX ADMIN — Medication 3 MG: at 15:15

## 2018-10-03 RX ADMIN — PROPOFOL 90 MG: 10 INJECTION, EMULSION INTRAVENOUS at 14:47

## 2018-10-03 RX ADMIN — SODIUM CHLORIDE, POTASSIUM CHLORIDE, SODIUM LACTATE AND CALCIUM CHLORIDE 9 ML/HR: 600; 310; 30; 20 INJECTION, SOLUTION INTRAVENOUS at 10:45

## 2018-10-03 RX ADMIN — HYDROCHLOROTHIAZIDE 25 MG: 25 TABLET ORAL at 18:44

## 2018-10-03 RX ADMIN — FAMOTIDINE 20 MG: 20 TABLET ORAL at 11:04

## 2018-10-03 RX ADMIN — GLYCOPYRROLATE 0.2 MG: 0.2 INJECTION, SOLUTION INTRAMUSCULAR; INTRAVENOUS at 15:22

## 2018-10-03 RX ADMIN — GLYCOPYRROLATE 0.2 MG: 0.2 INJECTION, SOLUTION INTRAMUSCULAR; INTRAVENOUS at 15:18

## 2018-10-03 RX ADMIN — GLYCOPYRROLATE 0.2 MG: 0.2 INJECTION, SOLUTION INTRAMUSCULAR; INTRAVENOUS at 14:59

## 2018-10-03 RX ADMIN — FENTANYL CITRATE 50 MCG: 50 INJECTION, SOLUTION INTRAMUSCULAR; INTRAVENOUS at 14:45

## 2018-10-03 RX ADMIN — LIDOCAINE HYDROCHLORIDE 20 MG: 10 INJECTION, SOLUTION INFILTRATION; PERINEURAL at 14:47

## 2018-10-03 RX ADMIN — ATRACURIUM BESYLATE 5 MG: 10 INJECTION, SOLUTION INTRAVENOUS at 15:00

## 2018-10-03 RX ADMIN — ESMOLOL HYDROCHLORIDE 5 MG: 10 INJECTION INTRAVENOUS at 15:18

## 2018-10-03 RX ADMIN — HYDROXYCHLOROQUINE SULFATE 200 MG: 200 TABLET, FILM COATED ORAL at 20:06

## 2018-10-03 RX ADMIN — Medication 5000 UNITS: at 20:06

## 2018-10-03 NOTE — ANESTHESIA POSTPROCEDURE EVALUATION
Patient: Cleo Hobbs    Procedure Summary     Date:  10/03/18 Room / Location:   [facility]    Anesthesia Start:  1442 Anesthesia Stop:  1545    Procedure:  INSERTION PERITONEAL DIALYSIS CATHETER removal (N/A Abdomen) Diagnosis:      Surgeon:  Gonzalez Sauceda MD Provider:  Lavell Linda MD    Anesthesia Type:  general ASA Status:  3          Anesthesia Type: general  Last vitals  BP   149/57 (10/03/18 1050)   Temp   97.4 °F (36.3 °C) (10/03/18 1050)   Pulse   71 (10/03/18 1050)   Resp   18 (10/03/18 1050)     SpO2   100 % (10/03/18 1050)     Post Anesthesia Care and Evaluation    Patient location during evaluation: PACU  Patient participation: complete - patient participated  Level of consciousness: awake  Pain score: 0  Pain management: adequate  Airway patency: patent  Anesthetic complications: anesthesia complication  PONV Status: none  Cardiovascular status: acceptable  Respiratory status: acceptable  Hydration status: acceptable    Comments: Pt weak at end of surgery despite full reversal  Pt tarnsferred to pacu ventilated   Extubated in pacu

## 2018-10-03 NOTE — H&P
[facility] Medicine Services  HISTORY AND PHYSICAL    Patient Name: Cleo Hobbs  : 1940  MRN: [id number]  Primary Care Physician: Provider, No Known  Date of admission: 10/3/2018      Subjective   Subjective     Chief Complaint:  Peritonitis associated with infected PD catheter    HPI:  Cleo Hobbs is a 78 y.o. female with PMH significant for morbid obesity, DM2 with associated nephropathy/CKD4, anemia of chronic disease, and HTN, who recently had PD catheter inserted by Dr Sauceda on 2018 in anticipation of poss need for dialysis, but renal function stabilized and pt did not begin dialysis yet. She was found to have PD cathetrer malfunction at  The most recent PD catheter checked, so she was scheduled for elective PD catheter repositioning with Dr Sauceda, however, during the procedure, pt was found to have purulent fluid expressed from her abdomen and PD catheter. The catheter was removed and pt started on abx. Hospitalists were asked to admit pt for further care. She denies any chest pain, abd pain, SOA, or palpitations. No hematochezia, melena, fever, but chronically has intermittent chills. No dysuria.      Review of Systems   Otherwise 10-system ROS reviewed and is negative except as mentioned in the HPI.    Personal History     Past Medical History:   Diagnosis Date   • Anemia    • Arthritis    • CKD (chronic kidney disease) stage 4, GFR 15-29 ml/min (CMS/HCC)    • Diabetes mellitus (CMS/HCC)    • Fibromyalgia    • Heart murmur    • Hypertension    • Sjoegren syndrome (CMS/HCC)        Past Surgical History:   Procedure Laterality Date   • ANKLE FUSION     • CATARACT EXTRACTION     • HYSTERECTOMY     • INSERTION PERITONEAL DIALYSIS CATHETER N/A 2018    Procedure: LAPAROSCOPIC PERITONEAL DIALYSIS CATHETER PLACEMENT;  Surgeon: Gonzalez Sauceda MD;  [facility];  Service: General       Family History: family history is not on file.     Social History:  " reports that she has quit smoking. She has never used smokeless tobacco. She reports that she does not drink alcohol or use drugs.  Social History     Social History Narrative   • No narrative on file       Medications:  Reviewed and reconciled    Allergies   Allergen Reactions   • Penicillins Anaphylaxis   • Sulfa Antibiotics Unknown (See Comments)     PT STATES IT \"MAKES HER SICK\" hurts her back       Objective   Objective     Vital Signs:   Temp:  [96.7 °F (35.9 °C)-97.4 °F (36.3 °C)] 96.7 °F (35.9 °C)  Heart Rate:  [71-87] 82  Resp:  [16-20] 20  BP: (128-155)/(52-71) 146/52  FiO2 (%):  [30 %] 30 %        Physical Exam   General Assessment: No acute cardiopulmonary distress. Well developed and well nourished.    HEENT: NCAT, PERRL, MM moist    Neck: Supple    CVS: RRR, S1S2 normal    Resp: CTAB, no adventitious sound    Abd: soft, mild incisional tenderness, ND, normal BS, no guarding or peritoneal signs, dressings C/D/I    Ext: + chronic edema (better than usual per pt), both calves are symmetric and NTTP    Neuro: Nonfocal    Psych: Affect is appropriate        Results Reviewed:  I have personally reviewed current lab, radiology, and data and agree.      Results from last 7 days  Lab Units 10/03/18  1047   WBC 10*3/mm3 9.31   HEMOGLOBIN g/dL 8.8*   HEMATOCRIT % 27.3*   PLATELETS 10*3/mm3 283       Results from last 7 days  Lab Units 10/03/18  1047   SODIUM mmol/L 139   POTASSIUM mmol/L 3.6   CHLORIDE mmol/L 110*   CO2 mmol/L 18.0*   BUN mg/dL 59*   CREATININE mg/dL 3.70*   GLUCOSE mg/dL 74   CALCIUM mg/dL 9.1   ALT (SGPT) U/L 30   AST (SGOT) U/L 44*     Estimated Creatinine Clearance: 13 mL/min (A) (by C-G formula based on SCr of 3.7 mg/dL (H)).  Brief Urine Lab Results     None        No results found for: BNP  Imaging Results (last 24 hours)     ** No results found for the last 24 hours. **             Assessment/Plan   Assessment / Plan     Hospital Problem List     * (Principal)Peritonitis due to infected " "peritoneal dialysis catheter (CMS/HCC)    Infection    Diabetic nephropathies (CMS/HCC)    HTN (hypertension)    Stage 4 chronic kidney disease (CMS/HCC)    Type 2 diabetes mellitus (CMS/HCC)    Anemia of chronic disease    Morbid obesity (CMS/HCC)    Venous stasis            Assessment & Plan:  Cleo Hobbs is a pleasant 78 y.o. female with PMH significant for morbid obesity, DM2 with associated nephropathy/CKD4, anemia of chronic disease, and HTN, who had recent PD catheter insertion in 8/2018, but then renal function stabilized, so she did not start dialysis yet, but had malfunctioning of PD catheter at last check up, initially here for elective repositioning of catheter, but intraoperatively, she was found to have purulent drainage from abd and PD catheter, concerning for peritonitis, so she will be admitted for further treatment. She is hemodynamically stable and nontoxic.    - Pt is allergic to PCN, causes anaphylaxis. She was started on Cleocin, awaiting ID eval and further rec  - Dr Sauceda informed me that he will call ID and nephrology for consultation, normally pt follows with Dr Vergara in [place] for CKD4. Not sure if she will need dialysis, but due to peritonitis, if dialysis is needed, it will probably need to be HD--defer to nephrology and Dr Sauceda.  - Pt has know anemia of chronic disease, baseline Hb 11, but now down to 8. Pt denies any hematochezia or melena. Last Guaiac at her PCP's office was negative. Last C'scope by Dr Brunner was also negative, \"not even a single polyp\" per pt. EBL was min (25cc). Will monitor and do basic anemia w/u. Transfuse PRN. Defer decision for Epogen to nephrology.  - SSI for DM2 for now, no recent A1C or TSH, will check with basic labs in am.  - Nutrition consult for diet education  - PT to eval and treat, at risk for fall/fall precation  - CM consult for DC planning    DVT prophylaxis: Heparin sq    CODE STATUS:    Code Status and Medical Interventions: "   Ordered at: 10/03/18 1706     Code Status:    CPR     Medical Interventions (Level of Support Prior to Arrest):    Full       Admission Status:  I believe this patient meets INPATIENT status due to the need for care which can only be reasonably provided in an hospital setting such as aggressive/expedited ancillary services and/or consultation services, the necessity for IV medications, close physician monitoring and/or the possible need for procedures.  In such, I feel patient’s risk for adverse outcomes and need for care warrant INPATIENT evaluation and predict the patient’s care encounter to likely last beyond 2 midnights.          Electronically signed by Muriel Soto MD, 10/03/18, 5:22 PM.

## 2018-10-03 NOTE — NURSING NOTE
Small skin tear noted with removal of tape from IV site on left forearm. Dry dressing and coban applied.

## 2018-10-03 NOTE — OP NOTE
General Surgery Operative Note    Cleo Hobbs  [id number]  1940    Date of Surgery:  10/3/2018 3:32 PM    Pre-op Diagnosis: Malfunctioning peritoneal dialysis catheter    Post-op Diagnosis: Malfunctioning peritoneal dialysis catheter                                    Infected peritoneal dialysis catheter      Procedure: Removal peritoneal dialysis catheter    Procedure(s):  PERITONEAL DIALYSIS CATHETER removal    Surgeon: Gonzalez Sauceda MD    Anesthesia: General    Fluids: 200 mL    Estimated Blood Loss: less than 25 ml    Urine Voided: NR    Specimens:  Aerobic/anaerobic cultures and the PD catheter tip for C&S              ID Type Source Tests Collected by Time   1 : ABDOMINAL SWAB AT SURGICAL SITE AFTER PERITONEAL CATHETER PULLED Swab Abdominal Wall ANAEROBIC CULTURE, FUNGAL CULTURE, GRAM STAIN, AFB CULTURE Gonzalez Sauceda MD 10/3/2018 1511   2 : PERITONEAL DIAYLSIS CATHETER. Swab Abdominal Wall ANAEROBIC CULTURE, FUNGAL CULTURE, GRAM STAIN, AFB CULTURE Gonzalez Sauceda MD 10/3/2018 1513     Findings: Purulent fluid expressed from the abdomen and the catheter itself    Complications: none apparent    History:   78-year-old lady with a PD catheter presents with a nonfunctioning catheter.  She had a PD catheter sinogram done at [facility] demonstrating free spill of contrast,  though they were unable to get any return of fluid from her abdomen.  I had a discussion with the patient regarding tip repositioning done laparoscopically.  Upon the patient getting to the hospital today it was noted that the Damon ring at the level of the skin had eroded through the skin itself and there was dark material within the catheter itself.       The risks and benefits of PD catheter removal and laparoscopic replacement of the PD catheter were rehashed.  Our discussion included but was not limited to: bleeding, infection, injury to adjacent viscera (small bowel, colon, stomach, rectum),  hernia formation, an open operation in general, and medical issues from a cardiopulmonary and deep venous thrombosis standpoint.  All questions were answered and they understand and wish to proceed with surgical intervention.    Procedure:      After informed consent the patient was taken to the operating room and placed in the supine position.  General anesthesia was induced, the abdomen was then prepped and draped in the standard sterile fashion.  An Ioban was placed on the skin.  A timeout was observed.     The catheter entry site through the rectus was opened.  A transverse incision through her prior scar was created with a 15 blade scalpel.  I dissected down through the subcutaneous elements with the electrocautery Bovie to the catheter itself.  The catheter was followed down through the anterior abdominal wall fascia and the Damon ring was freed from the surrounding tissue.  Upon removing the catheter one could obviously see purulent material located within the catheter itself.  The catheter was removed in its entirety.  The tip of the catheter was sent for culture and sensitivity.  Upon abdominal pressure I was able to express more purulent material from the abdominal cavity.  I irrigated the small wound itself here and closed the fascia with an interrupted 0 Vicryl.  Damon ring at the level of the skin was then freed and the entire catheter removed.  I placed a half inch iodoform strip in the tunnel tract itself.  The subcutaneous elements were then reapproximated with interrupted 3-0 Vicryl.  Both wounds were covered with a covered arm.  I did not think that replacement of the peritoneal dialysis catheter in the setting of gross intra-abdominal infection was appropriate.         Sterile dressings were placed on the wounds.  All lap and needle counts were correct at the end of the procedure ×2.  The patient was then transferred to the PACU in stable condition.    Gonzalez Sauceda MD     Date:  10/3/2018  Time: 3:32 PM

## 2018-10-03 NOTE — H&P
[facility] Pre-op    Full history and physical note from office is up to date.  See office note scanned 9/13/18.  S/P insertion PD catheter 8/13/18    /57 (BP Location: Right arm, Patient Position: Sitting)   Pulse 71   Temp 97.4 °F (36.3 °C) (Temporal Artery )   Resp 18   SpO2 100%   Breastfeeding? No     Cancer Staging (if applicable)  Cancer Patient: __ yes _x_no __unknown__N/A; If yes, clinical stage T:__ N:__M:__, stage group or __N/A    Linh Garcia, APRN 10/3/2018 10:58 AM     I have reviewed the above note, my prior clinic note, appropriate imaging, and labs.  I have again discussed the risks and benefits of laparoscopic peritoneal dialysis catheter repositioning with the patient.  All of their questions have been answered.  They understand and wish to proceed.

## 2018-10-03 NOTE — ANESTHESIA PREPROCEDURE EVALUATION
Anesthesia Evaluation                  Airway   Mallampati: I  TM distance: >3 FB  Neck ROM: full  No difficulty expected  Dental      Pulmonary    Cardiovascular     (+) hypertension, valvular problems/murmurs,       Neuro/Psych  GI/Hepatic/Renal/Endo    (+)   renal disease, diabetes mellitus,     Musculoskeletal     Abdominal    Substance History      OB/GYN          Other                        Anesthesia Plan    ASA 3     general     intravenous induction   Anesthetic plan, all risks, benefits, and alternatives have been provided, discussed and informed consent has been obtained with: patient.    Plan discussed with CRNA.

## 2018-10-03 NOTE — ANESTHESIA PROCEDURE NOTES
Airway  Urgency: elective    Airway not difficult    General Information and Staff    Patient location during procedure: OR  CRNA: VIVIENNE JUDGE    Indications and Patient Condition  Indications for airway management: airway protection    Preoxygenated: yes  MILS not maintained throughout  Mask difficulty assessment: 1 - vent by mask    Final Airway Details  Final airway type: endotracheal airway      Successful airway: ETT  Cuffed: yes   Successful intubation technique: direct laryngoscopy  Facilitating devices/methods: intubating stylet  Endotracheal tube insertion site: oral  Blade: Marine  Blade size: 3  ETT size: 7.0 mm  Cormack-Lehane Classification: grade I - full view of glottis  Placement verified by: chest auscultation and capnometry   Measured from: lips  ETT to lips (cm): 20  Number of attempts at approach: 1    Additional Comments  Negative epigastric sounds, Breath sound equal bilaterally with symmetric chest rise and fall

## 2018-10-03 NOTE — NURSING NOTE
Patient very talkative, alert and oriented. States she feels like she has not had adequate information regarding her treatment and choices relating to her kidney failure and options. Stated she would like to talk with discharge planning.

## 2018-10-03 NOTE — NURSING NOTE
Open puncture site noted to right of umbilicus, dry dressing and paper tape used to cover area.  Iodoform gauze visible in site.

## 2018-10-03 NOTE — NURSING NOTE
Mouth suctioned for scant clear secretions.  Able to hold head up for ten secs,  strong equally.  Extubated. Resp effort normal, see VS.  Oriented to person and place.

## 2018-10-03 NOTE — PLAN OF CARE
Problem: Patient Care Overview  Goal: Plan of Care Review  Outcome: Ongoing (interventions implemented as appropriate)   10/03/18 9315   Coping/Psychosocial   Plan of Care Reviewed With patient   OTHER   Outcome Summary Pt arrived to the floor at this time.

## 2018-10-04 LAB
ANION GAP SERPL CALCULATED.3IONS-SCNC: 8 MMOL/L (ref 3–11)
BASOPHILS # BLD AUTO: 0.02 10*3/MM3 (ref 0–0.2)
BASOPHILS NFR BLD AUTO: 0.3 % (ref 0–1)
BUN BLD-MCNC: 54 MG/DL (ref 9–23)
BUN/CREAT SERPL: 16 (ref 7–25)
CALCIUM SPEC-SCNC: 8.5 MG/DL (ref 8.7–10.4)
CHLORIDE SERPL-SCNC: 110 MMOL/L (ref 99–109)
CO2 SERPL-SCNC: 20 MMOL/L (ref 20–31)
CREAT BLD-MCNC: 3.38 MG/DL (ref 0.6–1.3)
DEPRECATED RDW RBC AUTO: 43 FL (ref 37–54)
EOSINOPHIL # BLD AUTO: 0 10*3/MM3 (ref 0–0.3)
EOSINOPHIL NFR BLD AUTO: 0 % (ref 0–3)
ERYTHROCYTE [DISTWIDTH] IN BLOOD BY AUTOMATED COUNT: 12.6 % (ref 11.3–14.5)
GFR SERPL CREATININE-BSD FRML MDRD: 13 ML/MIN/1.73
GLUCOSE BLD-MCNC: 100 MG/DL (ref 70–100)
GLUCOSE BLDC GLUCOMTR-MCNC: 109 MG/DL (ref 70–130)
GLUCOSE BLDC GLUCOMTR-MCNC: 117 MG/DL (ref 70–130)
GLUCOSE BLDC GLUCOMTR-MCNC: 159 MG/DL (ref 70–130)
GLUCOSE BLDC GLUCOMTR-MCNC: 289 MG/DL (ref 70–130)
HBA1C MFR BLD: 6.1 % (ref 4.8–5.6)
HCT VFR BLD AUTO: 26.9 % (ref 34.5–44)
HGB BLD-MCNC: 8.7 G/DL (ref 11.5–15.5)
IMM GRANULOCYTES # BLD: 0.05 10*3/MM3 (ref 0–0.03)
IMM GRANULOCYTES NFR BLD: 0.6 % (ref 0–0.6)
LYMPHOCYTES # BLD AUTO: 0.89 10*3/MM3 (ref 0.6–4.8)
LYMPHOCYTES NFR BLD AUTO: 11.1 % (ref 24–44)
MAGNESIUM SERPL-MCNC: 1.8 MG/DL (ref 1.3–2.7)
MCH RBC QN AUTO: 30.1 PG (ref 27–31)
MCHC RBC AUTO-ENTMCNC: 32.3 G/DL (ref 32–36)
MCV RBC AUTO: 93.1 FL (ref 80–99)
MONOCYTES # BLD AUTO: 0.63 10*3/MM3 (ref 0–1)
MONOCYTES NFR BLD AUTO: 7.9 % (ref 0–12)
NEUTROPHILS # BLD AUTO: 6.46 10*3/MM3 (ref 1.5–8.3)
NEUTROPHILS NFR BLD AUTO: 80.7 % (ref 41–71)
PHOSPHATE SERPL-MCNC: 5.1 MG/DL (ref 2.4–5.1)
PLATELET # BLD AUTO: 257 10*3/MM3 (ref 150–450)
PMV BLD AUTO: 10.7 FL (ref 6–12)
POTASSIUM BLD-SCNC: 3.8 MMOL/L (ref 3.5–5.5)
RBC # BLD AUTO: 2.89 10*6/MM3 (ref 3.89–5.14)
SODIUM BLD-SCNC: 138 MMOL/L (ref 132–146)
TSH SERPL DL<=0.05 MIU/L-ACNC: 2.68 MIU/ML (ref 0.35–5.35)
WBC NRBC COR # BLD: 8 10*3/MM3 (ref 3.5–10.8)

## 2018-10-04 PROCEDURE — 25010000002 HEPARIN (PORCINE) PER 1000 UNITS: Performed by: SURGERY

## 2018-10-04 PROCEDURE — 82962 GLUCOSE BLOOD TEST: CPT

## 2018-10-04 PROCEDURE — 85025 COMPLETE CBC W/AUTO DIFF WBC: CPT | Performed by: HOSPITALIST

## 2018-10-04 PROCEDURE — G0378 HOSPITAL OBSERVATION PER HR: HCPCS

## 2018-10-04 PROCEDURE — G8978 MOBILITY CURRENT STATUS: HCPCS

## 2018-10-04 PROCEDURE — 97161 PT EVAL LOW COMPLEX 20 MIN: CPT

## 2018-10-04 PROCEDURE — 84100 ASSAY OF PHOSPHORUS: CPT | Performed by: SURGERY

## 2018-10-04 PROCEDURE — 25010000002 LEVOFLOXACIN PER 250 MG: Performed by: INTERNAL MEDICINE

## 2018-10-04 PROCEDURE — 83735 ASSAY OF MAGNESIUM: CPT | Performed by: SURGERY

## 2018-10-04 PROCEDURE — 63710000001 INSULIN LISPRO (HUMAN) PER 5 UNITS: Performed by: NURSE PRACTITIONER

## 2018-10-04 PROCEDURE — G8979 MOBILITY GOAL STATUS: HCPCS

## 2018-10-04 PROCEDURE — 80048 BASIC METABOLIC PNL TOTAL CA: CPT | Performed by: SURGERY

## 2018-10-04 PROCEDURE — 84443 ASSAY THYROID STIM HORMONE: CPT | Performed by: HOSPITALIST

## 2018-10-04 PROCEDURE — 83036 HEMOGLOBIN GLYCOSYLATED A1C: CPT | Performed by: HOSPITALIST

## 2018-10-04 PROCEDURE — 25010000002 DAPTOMYCIN PER 1 MG: Performed by: INTERNAL MEDICINE

## 2018-10-04 RX ORDER — DOCUSATE SODIUM 100 MG/1
100 CAPSULE, LIQUID FILLED ORAL DAILY
Status: DISCONTINUED | OUTPATIENT
Start: 2018-10-05 | End: 2018-10-08 | Stop reason: HOSPADM

## 2018-10-04 RX ORDER — SENNA AND DOCUSATE SODIUM 50; 8.6 MG/1; MG/1
2 TABLET, FILM COATED ORAL NIGHTLY PRN
Status: DISCONTINUED | OUTPATIENT
Start: 2018-10-04 | End: 2018-10-08 | Stop reason: HOSPADM

## 2018-10-04 RX ORDER — POVIDONE-IODINE 10 MG/G
OINTMENT TOPICAL EVERY 12 HOURS SCHEDULED
Status: DISCONTINUED | OUTPATIENT
Start: 2018-10-04 | End: 2018-10-08 | Stop reason: HOSPADM

## 2018-10-04 RX ORDER — BISACODYL 5 MG/1
10 TABLET, DELAYED RELEASE ORAL DAILY PRN
Status: DISCONTINUED | OUTPATIENT
Start: 2018-10-04 | End: 2018-10-08 | Stop reason: HOSPADM

## 2018-10-04 RX ORDER — LEVOFLOXACIN 5 MG/ML
750 INJECTION, SOLUTION INTRAVENOUS ONCE
Status: COMPLETED | OUTPATIENT
Start: 2018-10-04 | End: 2018-10-04

## 2018-10-04 RX ORDER — LEVOFLOXACIN 500 MG/1
500 TABLET, FILM COATED ORAL
Status: DISCONTINUED | OUTPATIENT
Start: 2018-10-06 | End: 2018-10-04

## 2018-10-04 RX ADMIN — INSULIN LISPRO 2 UNITS: 100 INJECTION, SOLUTION INTRAVENOUS; SUBCUTANEOUS at 21:33

## 2018-10-04 RX ADMIN — HYDROCHLOROTHIAZIDE 25 MG: 25 TABLET ORAL at 09:14

## 2018-10-04 RX ADMIN — AMLODIPINE BESYLATE 10 MG: 10 TABLET ORAL at 09:08

## 2018-10-04 RX ADMIN — AZTREONAM 1 G: 1 INJECTION, POWDER, LYOPHILIZED, FOR SOLUTION INTRAMUSCULAR; INTRAVENOUS at 16:24

## 2018-10-04 RX ADMIN — HYDROXYCHLOROQUINE SULFATE 200 MG: 200 TABLET, FILM COATED ORAL at 09:14

## 2018-10-04 RX ADMIN — LEVOFLOXACIN 750 MG: 5 INJECTION, SOLUTION INTRAVENOUS at 14:24

## 2018-10-04 RX ADMIN — HEPARIN SODIUM 5000 UNITS: 5000 INJECTION, SOLUTION INTRAVENOUS; SUBCUTANEOUS at 06:25

## 2018-10-04 RX ADMIN — POVIDONE-IODINE: 100 OINTMENT TOPICAL at 20:18

## 2018-10-04 RX ADMIN — HEPARIN SODIUM 5000 UNITS: 5000 INJECTION, SOLUTION INTRAVENOUS; SUBCUTANEOUS at 13:37

## 2018-10-04 RX ADMIN — Medication 5000 UNITS: at 09:14

## 2018-10-04 RX ADMIN — CLINDAMYCIN PHOSPHATE 600 MG: 600 INJECTION, SOLUTION INTRAVENOUS at 06:25

## 2018-10-04 RX ADMIN — ASPIRIN 81 MG: 81 TABLET, COATED ORAL at 09:13

## 2018-10-04 RX ADMIN — HEPARIN SODIUM 5000 UNITS: 5000 INJECTION, SOLUTION INTRAVENOUS; SUBCUTANEOUS at 20:19

## 2018-10-04 RX ADMIN — FAMOTIDINE 20 MG: 20 TABLET ORAL at 09:16

## 2018-10-04 RX ADMIN — TERAZOSIN HYDROCHLORIDE 5 MG: 5 CAPSULE ORAL at 21:32

## 2018-10-04 RX ADMIN — HYDROXYCHLOROQUINE SULFATE 200 MG: 200 TABLET, FILM COATED ORAL at 20:18

## 2018-10-04 RX ADMIN — DAPTOMYCIN 550 MG: 500 INJECTION, POWDER, LYOPHILIZED, FOR SOLUTION INTRAVENOUS at 13:31

## 2018-10-04 NOTE — THERAPY EVALUATION
Acute Care - Physical Therapy Initial Evaluation  [facility]     Patient Name: Cleo Hobbs  : 1940  MRN: [id number]  Today's Date: 10/4/2018   Onset of Illness/Injury or Date of Surgery: 10/03/18  Date of Referral to PT: 10/03/18  Referring Physician: CHEO Soto MD      Admit Date: 10/3/2018    Visit Dx:     ICD-10-CM ICD-9-CM   1. Impaired functional mobility, balance, gait, and endurance Z74.09 V49.89   2. Infection B99.9 136.9     Patient Active Problem List   Diagnosis   • Infection   • Diabetic nephropathies (CMS/HCC)   • HTN (hypertension)   • Stage 4 chronic kidney disease (CMS/HCC)   • Peritonitis due to infected peritoneal dialysis catheter (CMS/HCC)   • Type 2 diabetes mellitus (CMS/HCC)   • Anemia of chronic disease   • Morbid obesity (CMS/HCC)   • Venous stasis     Past Medical History:   Diagnosis Date   • Anemia    • Arthritis    • CKD (chronic kidney disease) stage 4, GFR 15-29 ml/min (CMS/HCC)    • Diabetes mellitus (CMS/HCC)    • Fibromyalgia    • Heart murmur    • Hypertension    • Sjoegren syndrome (CMS/HCC)      Past Surgical History:   Procedure Laterality Date   • ANKLE FUSION     • CATARACT EXTRACTION     • HYSTERECTOMY     • INSERTION PERITONEAL DIALYSIS CATHETER N/A 2018    Procedure: LAPAROSCOPIC PERITONEAL DIALYSIS CATHETER PLACEMENT;  Surgeon: Gonzalez Sauceda MD;  Location: [facility];  Service: General   • INSERTION PERITONEAL DIALYSIS CATHETER N/A 10/3/2018    Procedure: INSERTION PERITONEAL DIALYSIS CATHETER REMOVAL;  Surgeon: Gonzalez Sauceda MD;  Location: [facility];  Service: General        PT ASSESSMENT (last 12 hours)      Physical Therapy Evaluation     Row Name 10/04/18 1115          PT Evaluation Time/Intention    Subjective Information complains of;weakness;pain  -MB     Document Type evaluation  -MB     Mode of Treatment physical therapy  -MB     Patient Effort good  -MB     Symptoms Noted During/After Treatment fatigue  -MB     Row Name  10/04/18 1115          General Information    Patient Profile Reviewed? yes  -MB     Onset of Illness/Injury or Date of Surgery 10/03/18  -MB     Referring Physician CHEO Soto MD  -MB     Patient Observations alert;cooperative;agree to therapy  -MB     Patient/Family Observations No family present at bedside.  -MB     General Observations of Patient Pt. sitting UIC, on RA, telemetry, IV heplocked.  RN consent for PT.  -MB     Prior Level of Function independent:;all household mobility;community mobility;gait;transfer;bed mobility;ADL's;home management;driving  -MB     Equipment Currently Used at Home bath bench;wheelchair  -MB     Pertinent History of Current Functional Problem Pt. is an 79 yo female adm w/ peritonitis w/ infected PD catheter.  Pt s/p PD cath removal 10/3.  PMH: remote R ankle fx. w/ pinning; wears boot.  -MB     Existing Precautions/Restrictions fall  -MB     Limitations/Impairments sensory  -MB     Row Name 10/04/18 1115          Relationship/Environment    Primary Source of Support/Comfort spouse  -MB     Lives With spouse  -MB     Row Name 10/04/18 1115          Resource/Environmental Concerns    Current Living Arrangements home/apartment/condo  -MB     Row Name 10/04/18 1115          Home Main Entrance    Number of Stairs, Main Entrance four   4 at front, 5 at back door  -MB     Stair Railings, Main Entrance none  -MB     Row Name 10/04/18 1115          Cognitive Assessment/Intervention- PT/OT    Orientation Status (Cognition) oriented x 4  -MB     Follows Commands (Cognition) WFL  -MB     Row Name 10/04/18 1115          Safety Issues, Functional Mobility    Impairments Affecting Function (Mobility) balance;range of motion (ROM);strength;pain  -MB     Row Name 10/04/18 1115          Bed Mobility Assessment/Treatment    Comment (Bed Mobility) Pt. received and left sitting UIC.  -MB     Row Name 10/04/18 1115          Transfer Assessment/Treatment    Transfer Assessment/Treatment sit-stand  transfer;stand-sit transfer;toilet transfer  -MB     Comment (Transfers) VCs for sequencing and safe hand placement.  -MB     Sit-Stand [place] (Transfers) contact guard;verbal cues  -MB     Stand-Sit [place] (Transfers) contact guard;verbal cues  -MB     [place] Level (Toilet Transfer) contact guard  -MB     Assistive Device (Toilet Transfer) --   RUE support  -MB     Row Name 10/04/18 1115          Sit-Stand Transfer    Assistive Device (Sit-Stand Transfers) other (see comments)   UE support  -MB     Row Name 10/04/18 1115          Stand-Sit Transfer    Assistive Device (Stand-Sit Transfers) --   R UE support  -MB     Row Name 10/04/18 1115          Toilet Transfer    Type (Toilet Transfer) sit-stand;stand-sit  -MB     Row Name 10/04/18 1115          Gait/Stairs Assessment/Training    [place] Level (Gait) contact guard;verbal cues  -MB     Assistive Device (Gait) --   RUE support  -MB     Distance in Feet (Gait) 100  -MB     Pattern (Gait) step-through  -MB     Deviations/Abnormal Patterns (Gait) base of support, wide;blas decreased;gait speed decreased;stride length decreased  -MB     Bilateral Gait Deviations heel strike decreased;weight shift ability decreased  -MB     Right Sided Gait Deviations weight shift ability decreased  -MB     Comment (Gait/Stairs) Pt. demo step-through gait pattern at slow pace w/ dec stance phase R LE.  VCs for safety; recommended pt. use SPC for safe ambulation.  Pt. required 1 seated rest break.  -MB     Row Name 10/04/18 1115          General ROM    GENERAL ROM COMMENTS dec R ankle AROM (d/t remote fx. and pinning); otherwise, BLEs WFL.  -MB     Row Name 10/04/18 1115          MMT (Manual Muscle Testing)    General MMT Comments BLEs grossly 4-/5, BUEs 4/5.  -MB     Row Name 10/04/18 1115          Motor Assessment/Intervention    Additional Documentation Balance (Group)  -MB     Row Name 10/04/18 1115          Balance    Balance static standing balance  -MB      [facility] [place] 10/04/18 1115          Static Standing Balance    Level of [place] (Supported Standing, Static Balance) contact guard assist  -MB     Time Able to Maintain Position (Supported Standing, Static Balance) 1 to 2 minutes  -MB     Assistive Device Utilized (Supported Standing, Static Balance) --   RUE support  -MB     Row Name 10/04/18 1115          Sensory Assessment/Intervention    Sensory General Assessment light touch sensation deficits identified  -MB     Row Name 10/04/18 1115          Light Touch Sensation Assessment    Left Lower Extremity: Light Touch Sensation Assessment mild impairment, 75% or more correct responses  -MB     Right Lower Extremity: Light Touch Sensation Assessment severe impairment, less than 50% correct responses  -MB     Row Name 10/04/18 1115          Pain Assessment    Additional Documentation Pain Scale: Numbers Pre/Post-Treatment (Group)  -MB     Row Name 10/04/18 1115          Pain Scale: Numbers Pre/Post-Treatment    Pain Scale: Numbers, Pretreatment 3/10  -MB     Pain Scale: Numbers, Post-Treatment 3/10  -MB     Pain Location - Orientation incisional  -MB     Pain Location abdomen  -MB     Row Name             Wound 10/03/18 1520 abdomen incision    Wound - Properties Group Date first assessed: 10/03/18  -TV Time first assessed: 1520  -TV Location: abdomen  -TV Type: incision  -TV    [facility]             Wound 10/03/18 1815 Left lower arm skin tear    Wound - Properties Group Date first assessed: 10/03/18  -SR Time first assessed: 1815  -SR Present On Admission : yes  -SR Side: Left  -SR Orientation: lower  -SR Location: arm  -SR Type: skin tear  -SR    Row Name 10/04/18 1115          Plan of Care Review    Plan of Care Reviewed With patient  -MB     Row Name 10/04/18 1115          Physical Therapy Clinical Impression    Date of Referral to PT 10/03/18  -MB     PT Diagnosis (PT Clinical Impression) functional decline  -MB     Functional Level at Time of Evaluation (PT  Clinical Impression) Pt. required assist for safe mobility.  -MB     Patient/Family Goals Statement (PT Clinical Impression) Return to home and PLOF.  -MB     Criteria for Skilled Interventions Met (PT Clinical Impression) yes;treatment indicated  -MB     Rehab Potential (PT Clinical Summary) good, to achieve stated therapy goals  -MB     Care Plan Review (PT) evaluation/treatment results reviewed;care plan/treatment goals reviewed;risks/benefits reviewed;current/potential barriers reviewed;patient/other agree to care plan  -MB     Row Name 10/04/18 1115          Vital Signs    Pre Systolic BP Rehab 150  -MB     Pre Treatment Diastolic BP 55  -MB     Pre Patient Position Sitting  -MB     Intra Patient Position Standing  -MB     Post Patient Position Sitting  -MB     Row Name 10/04/18 1115          Physical Therapy Goals    Bed Mobility Goal Selection (PT) bed mobility, PT goal 1  -MB     Transfer Goal Selection (PT) transfer, PT goal 1  -MB     Gait Training Goal Selection (PT) gait training, PT goal 1  -MB     Stairs Goal Selection (PT) stairs, PT goal 1  -MB     Additional Documentation Stairs Goal Selection (PT) (Row)  -MB     Row Name 10/04/18 1115          Bed Mobility Goal 1 (PT)    Activity/Assistive Device (Bed Mobility Goal 1, PT) bed mobility activities, all  -MB     [place] Level/Cues Needed (Bed Mobility Goal 1, PT) independent  -MB     Time Frame (Bed Mobility Goal 1, PT) 1 week  -MB     Progress/Outcomes (Bed Mobility Goal 1, PT) goal ongoing  -MB     Row Name 10/04/18 1115          Transfer Goal 1 (PT)    Activity/Assistive Device (Transfer Goal 1, PT) sit-to-stand/stand-to-sit;cane, straight  -MB     [place] Level/Cues Needed (Transfer Goal 1, PT) conditional independence  -MB     Time Frame (Transfer Goal 1, PT) 1 week  -MB     Progress/Outcome (Transfer Goal 1, PT) goal ongoing  -MB     Row Name 10/04/18 1115          Gait Training Goal 1 (PT)    Activity/Assistive Device (Gait Training  Goal 1, PT) gait (walking locomotion);cane, straight  -MB     [place] Level (Gait Training Goal 1, PT) conditional independence  -MB     Distance (Gait Goal 1, PT) 250  -MB     Time Frame (Gait Training Goal 1, PT) 1 week  -MB     Progress/Outcome (Gait Training Goal 1, PT) goal ongoing  -MB     Row Name 10/04/18 1115          Stairs Goal 1 (PT)    Activity/Assistive Device (Stairs Goal 1, PT) stairs, all skills  -MB     [place] Level/Cues Needed (Stairs Goal 1, PT) contact guard assist  -MB     Number of Stairs (Stairs Goal 1, PT) 4  -MB     Time Frame (Stairs Goal 1, PT) 1 week  -MB     Progress/Outcome (Stairs Goal 1, PT) goal ongoing  -MB     Row Name 10/04/18 1115          Positioning and Restraints    Pre-Treatment Position sitting in chair/recliner  -MB     Post Treatment Position chair  -MB     In Chair notified nsg;reclined;call light within reach;encouraged to call for assist;legs elevated;heels elevated  -MB     Row Name 10/04/18 1115          Living Environment    Home Accessibility stairs to enter home  -MB       User Key  (r) = Recorded By, (t) = Taken By, (c) = Cosigned By    Initials Name Provider Type    TV Minda Charlton, RN Registered Nurse    Manuela Locke, PT Physical Therapist    Bharti Omer RN Registered Nurse              PT Recommendation and Plan  Anticipated Discharge Disposition (PT): home with assist, home with home health  Planned Therapy Interventions (PT Eval): balance training, bed mobility training, gait training, home exercise program, patient/family education, stair training, strengthening, transfer training  Therapy Frequency (PT Clinical Impression): daily  Outcome Summary/Treatment Plan (PT)  Anticipated Equipment Needs at Discharge (PT):  (TBD)  Anticipated Discharge Disposition (PT): home with assist, home with home health  Plan of Care Reviewed With: patient  Progress: improving  Outcome Summary: PT eval completed.  Pt. pleasant w/ good  effort.  Pt. presents w/ generalized weakness, balance deficits, gait instability, and dec functional endurance.  Pt. performed sit to stand transfers w/ R UE support and CGA, and ambulated 100ft w/ UE support and CGA, w/ 1 prolonged seated rest break.  Pt. will benefit from skilled IPPT to address impairments and promote return to PLOF.  Recommend home w/ assist and HHPT upon D/C.          Outcome Measures     Row Name 10/04/18 1115             How much help from another person do you currently need...    Turning from your back to your side while in flat bed without using bedrails? 4  -MB      Moving from lying on back to sitting on the side of a flat bed without bedrails? 3  -MB      Moving to and from a bed to a chair (including a wheelchair)? 3  -MB      Standing up from a chair using your arms (e.g., wheelchair, bedside chair)? 3  -MB      Climbing 3-5 steps with a railing? 3  -MB      To walk in hospital room? 3  -MB      AM-PAC 6 Clicks Score 19  -MB         Functional Assessment    Outcome Measure Options AM-PAC 6 Clicks Basic Mobility (PT)  -MB        User Key  (r) = Recorded By, (t) = Taken By, (c) = Cosigned By    Initials Name Provider Type    Manuela Locke, PT Physical Therapist           Time Calculation:         PT Charges     Row Name 10/04/18 1327             Time Calculation    Start Time 1115  -MB      PT Received On 10/04/18  -MB      PT Goal Re-Cert Due Date 10/14/18  -MB        User Key  (r) = Recorded By, (t) = Taken By, (c) = Cosigned By    Initials Name Provider Type    Manuela Locke, PT Physical Therapist        Therapy Suggested Charges     Code   Minutes Charges    None           Therapy Charges for Today     Code Description Service Date Service Provider Modifiers Qty    10983185345 HC PT MOBILITY CURRENT 10/4/2018 Manuela Chang, PT GP, CJ 1    90173770210 HC PT MOBILITY PROJECTED 10/4/2018 Manuela Chang, PT GP, CI 1    55635876193 HC PT EVAL LOW COMPLEXITY  4 10/4/2018 Manuela Chang, PT GP 1          PT G-Codes  Outcome Measure Options: AM-PAC 6 Clicks Basic Mobility (PT)  AM-PAC 6 Clicks Score: 19  Functional Limitation: Mobility: Walking and moving around  Mobility: Walking and Moving Around Current Status (): At least 20 percent but less than 40 percent impaired, limited or restricted  Mobility: Walking and Moving Around Goal Status (): At least 1 percent but less than 20 percent impaired, limited or restricted      Manuela Chang, PT  10/4/2018

## 2018-10-04 NOTE — PLAN OF CARE
Problem: Patient Care Overview  Goal: Plan of Care Review  Outcome: Ongoing (interventions implemented as appropriate)   10/04/18 1324   Coping/Psychosocial   Plan of Care Reviewed With patient   OTHER   Outcome Summary PT eval completed. Pt. pleasant w/ good effort. Pt. presents w/ generalized weakness, balance deficits, gait instability, and dec functional endurance. Pt. performed sit to stand transfers w/ R UE support and CGA, and ambulated 100ft w/ UE support and CGA, w/ 1 prolonged seated rest break. Pt. will benefit from skilled IPPT to address impairments and promote return to PLOF. Recommend home w/ assist and HHPT upon D/C.   Plan of Care Review   Progress improving

## 2018-10-04 NOTE — PROGRESS NOTES
Clinical Nutrition   Reason For Visit: Need for education    Patient Name: Cleo Hobbs  YOB: 1940  MRN: [id number]  Date of Encounter: 10/04/18 4:16 PM  Admission date: 10/3/2018          Nutrition Assessment     Hospital Problem List  Principal Problem:    Peritonitis due to infected peritoneal dialysis catheter (CMS/HCC)  Active Problems:    Infection    Diabetic nephropathies (CMS/HCC)    HTN (hypertension)    Stage 4 chronic kidney disease (CMS/HCC)    Type 2 diabetes mellitus (CMS/HCC)    Anemia of chronic disease    Morbid obesity (CMS/HCC)    Venous stasis          PMH: She  has a past medical history of Anemia; Arthritis; CKD (chronic kidney disease) stage 4, GFR 15-29 ml/min (CMS/HCC); Diabetes mellitus (CMS/HCC); Fibromyalgia; Heart murmur; Hypertension; and Sjoegren syndrome (CMS/HCC).   PSxH: She  has a past surgical history that includes Hysterectomy; Ankle Fusion; Cataract extraction; Peritoneal Dialysis Catheter (N/A, 8/13/2018); and Peritoneal Dialysis Catheter (N/A, 10/3/2018).     Peritonitis    s/p removal of peritoneal catheter 10-3-18     Reported/Observed/Food/Nutrition Related History     Pt sitting up in chair, reports appetite improved, has some swelling in her legs, has multiple questions regarding kidney disease and HD, ? plans for future access      Anthropometrics   Height: 63in  Weight: 202lb  BMI: 35.8  BMI classification: Obese Class II: 35-39.9kg/m2           GI: wnl    SKIN: abdominal incision where peritoneal access removed, L. arm - skin tear     Labs reviewed   Labs reviewed: Yes    Results from last 7 days  Lab Units 10/04/18  0736 10/03/18  1047   SODIUM mmol/L 138 139   POTASSIUM mmol/L 3.8 3.6   CHLORIDE mmol/L 110* 110*   CO2 mmol/L 20.0 18.0*   BUN mg/dL 54* 59*   CREATININE mg/dL 3.38* 3.70*   GLUCOSE mg/dL 100 74   CALCIUM mg/dL 8.5* 9.1   PHOSPHORUS mg/dL 5.1  --    MAGNESIUM mg/dL 1.8  --        Results from last 7 days  Lab Units 10/04/18  0736  10/03/18  1047   WBC 10*3/mm3 8.00 9.31   ALBUMIN g/dL  --  3.60       Results from last 7 days  Lab Units 10/04/18  1612 10/04/18  1127 10/04/18  0713 10/03/18  2033 10/03/18  1549 10/03/18  1047   GLUCOSE mg/dL 159* 117 109 115 96 79       Lab Results  Lab Value Date/Time   HGBA1C 6.10 (H) 10/04/2018 0736     Medications reviewed   Medications reviewed: Yes      Intake/Ouptut 24 hrs (7:00AM - 6:59 AM)     Intake & Output (last day)       10/03 0701 - 10/04 0700 10/04 0701 - 10/05 0700    P.O. 75 0    I.V. (mL/kg) 400 (4.4)     Total Intake(mL/kg) 475 (5.2) 0 (0)    Urine (mL/kg/hr) 1200 500 (0.6)    Total Output 1200 500    Net -725 -500          Unmeasured Urine Occurrence 1 x 1 x            Current Nutrition Prescription   PO: Diet Regular; Consistent Carbohydrate, Renal  Evaluation of Received Nutrient/Fluid Intake: 67% of 3 meals        Nutrition Diagnosis     Problem Food and nutrition knowledge deficit   Etiology Per Clinical Status - ESRD   Signs/Symptoms Multiple questions regarding diet                       Intervention   Intervention: Follow treatment progress, Menu provided, Education provided    Provided pt with basic renal diet education, pt is not currently on any PD or HD yet, advise pt to f/u with renal doctor to monitor labs and advise of future diet restrictions    Goal:   General: Nutrition support treatment  PO: Maintain intake      Additional goals: Renal Education      Monitoring/Evaluation:       Monitoring/Evaluation: Per protocol    Linh Owen RD  Time Spent: 45min

## 2018-10-04 NOTE — CONSULTS
Infectious Disease Consult  Cleo Hobbs  1940  [id number]    Date of Consult: 10/4/2018  Admission Date: 10/3/2018    Requesting Provider: Gonzalez Sauceda, *  Evaluating Physician: Sameer Adame MD    Chief Complaint: abdominal pain    Reason for Consultation: peritonitis due to infected PD catheter    History of present illness:   Patient is a 78 y.o.  Yr old female with history of CKD4, DM2 and Sjogrens.  On August 13 she had placement of a peritoneal dialysis catheter by Dr. Sauceda, in preparation for likely dialysis.  The catheter was in place for 4 weeks before it was flushed for the first time.  It worked the first week it was, but the second week no fluid was returned after it was infused.  She subsequently had several manipulations to try to fix the catheter ultimately nothing worked.  There was some concern for catheter infection due to abdominal pain and some local erythema around the insertion site.  There is also some white mucoid drainage from around the base of the catheter.  She is brought to the OR on 10/13 for planned repositioning of the catheter but intraoperatively she was noted to have purulent material both in the catheter and within the abdominal cavity itself.  Thus the catheter was removed and cultures were taken.  She has been started on IV clindamycin due to her antibiotic allergies.  She has not noticed any fevers throughout this whole episode that may have had some chills.  She was not been on any antibiotics for the past 6 weeks prior to admission.  Right before she had a peritoneal dialysis catheter placed she was diagnosed with a UTI and treated with an unknown antibiotic.  She complains of right-sided abdominal pain.  She has chronic, unchanged back pain.  Denies having any diarrhea.  She is urinating normally.    Afebrile since admission.  Hemodynamic stable.  She is on clindamycin which is well tolerated thus far.  Her abdominal pain is improved but not resolved.   PD catheter removed in OR yesterday and insertion site is currently dressed.      Review of Systems  Constitutional-- No Fever, but + chills prior to admission.  Appetite poor,  and +malaise.  Heent-- No new vision, hearing or throat complaints.  No epistaxis or oral sores.  Denies odynophagia or dysphagia.  No headache, photophobia or neck stiffness.  CV-- No chest pain, palpitation or syncope  Resp-- No SOB/cough/Hemoptysis  GI- No nausea, vomiting, or diarrhea.   -- No dysuria, hematuria..  Denies hesitancy, urgency. + flank pain.  Lymph- no swollen lymph nodes in neck/axilla or groin.   Heme- No active bruising or bleeding  MS-- no swelling or pain in the bones or joints of arms/legs.  Leg swelling less than normal.  Bilateral lower extremity erythema is actually improved from baseline.  Chronic, unchanged back pain.  Neuro-- No acute focal weakness or numbness in the arms or legs.  Skin-- No rashes, lesions, ulcers. No skin breakdown      Past Medical History:   Diagnosis Date   • Anemia    • Arthritis    • CKD (chronic kidney disease) stage 4, GFR 15-29 ml/min (CMS/HCC)    • Diabetes mellitus (CMS/HCC)    • Fibromyalgia    • Heart murmur    • Hypertension    • Sjoegren syndrome (CMS/HCC)        Past Surgical History:   Procedure Laterality Date   • ANKLE FUSION     • CATARACT EXTRACTION     • HYSTERECTOMY     • INSERTION PERITONEAL DIALYSIS CATHETER N/A 8/13/2018    Procedure: LAPAROSCOPIC PERITONEAL DIALYSIS CATHETER PLACEMENT;  Surgeon: Gonzalez Sauceda MD;  Location: [facility] OR;  Service: General   • INSERTION PERITONEAL DIALYSIS CATHETER N/A 10/3/2018    Procedure: INSERTION PERITONEAL DIALYSIS CATHETER REMOVAL;  Surgeon: Gonzalez Sauceda MD;  Location: [facility] OR;  Service: General       Social History     Social History   • Marital status: Unknown     Spouse name: N/A   • Number of children: N/A   • Years of education: N/A     Occupational History   • Not on file.     Social History Main  "Topics   • Smoking status: Former Smoker   • Smokeless tobacco: Never Used      Comment: quit age 37   • Alcohol use No   • Drug use: No   • Sexual activity: Defer     Other Topics Concern   • Not on file     Social History Narrative   • No narrative on file     Family hx: No family history of diabetes    Allergies   Allergen Reactions   • Penicillins Anaphylaxis   • Sulfa Antibiotics Unknown (See Comments)     PT STATES IT \"MAKES HER SICK\" hurts her back   Penicillin caused anaphylaxis as a young adult.  She is also had a rash with Keflex.  She took Bactrim for a UTI and she thinks it caused her to have back pain but no other allergic symptoms    Antibiotics:  Anti-Infectives     Ordered     Dose/Rate Route Frequency Start Stop    10/03/18 1711  clindamycin (CLEOCIN) 600 mg in dextrose 5% 50 mL IVPB (premix)     Ordering Provider:  Muriel Soto MD    600 mg Intravenous Every 8 Hours 10/03/18 2153 10/04/18 0745    10/03/18 1553  hydroxychloroquine (PLAQUENIL) tablet 200 mg     Ordering Provider:  Gonzalez Sauceda MD    200 mg Oral 2 Times Daily 10/03/18 2100            Other Medications:  Current Facility-Administered Medications   Medication Dose Route Frequency Provider Last Rate Last Dose   • acetaminophen (TYLENOL) tablet 650 mg  650 mg Oral Q4H PRN Gonzalez Sauceda MD        Or   • acetaminophen (TYLENOL) 160 MG/5ML solution 650 mg  650 mg Oral Q4H PRN Gonzalez Sauceda MD        Or   • acetaminophen (TYLENOL) suppository 650 mg  650 mg Rectal Q4H PRN Gonzalez Sauceda MD       • amLODIPine (NORVASC) tablet 10 mg  10 mg Oral Daily Gonzalez Sauceda MD   10 mg at 10/04/18 0908   • aspirin EC tablet 81 mg  81 mg Oral Daily Gonzalez Sauceda MD   81 mg at 10/04/18 0913   • dextrose (D50W) 25 g/ 50mL Intravenous Solution 25 g  25 g Intravenous Q15 Min PRN Herlinda Buckley, APRN       • dextrose (GLUTOSE) oral gel 15 g  15 g Oral Q15 Min PRN Herlinda Buckley, " "APRN       • docusate sodium (COLACE) capsule 100 mg  100 mg Oral BID PRN Gonzalez Sauceda MD       • famotidine (PEPCID) tablet 20 mg  20 mg Oral Daily Muriel Soto MD   20 mg at 10/04/18 0916   • glucagon (human recombinant) (GLUCAGEN DIAGNOSTIC) injection 1 mg  1 mg Subcutaneous PRN Herlinda Buckley, APRN       • heparin (porcine) 5000 UNIT/ML injection 5,000 Units  5,000 Units Subcutaneous Q8H Gonzalez Sauceda MD   5,000 Units at 10/04/18 0625   • hydrochlorothiazide (HYDRODIURIL) tablet 25 mg  25 mg Oral Daily Gonzalez Sauceda MD   25 mg at 10/04/18 0914   • HYDROcodone-acetaminophen (NORCO) 5-325 MG per tablet 1 tablet  1 tablet Oral Q4H PRN Gonzalez Sauceda MD       • hydroxychloroquine (PLAQUENIL) tablet 200 mg  200 mg Oral BID Gonzalez Sauceda MD   200 mg at 10/04/18 0914   • insulin lispro (humaLOG) injection 0-7 Units  0-7 Units Subcutaneous 4x Daily With Meals & Nightly Herlinda Buckley, APRN       • magnesium hydroxide (MILK OF MAGNESIA) suspension 2400 mg/10mL 10 mL  10 mL Oral Daily PRN Gonzalez Sauceda MD       • Morphine sulfate (PF) injection 2 mg  2 mg Intravenous Q2H PRN Gonzalez Sauceda MD        And   • naloxone (NARCAN) injection 0.4 mg  0.4 mg Intravenous Q5 Min PRN Gonzalez Sauceda MD       • ondansetron (ZOFRAN) tablet 4 mg  4 mg Oral Q6H PRN Gonzalez Sauceda MD        Or   • ondansetron (ZOFRAN) injection 4 mg  4 mg Intravenous Q6H PRN Gonzalez Sauceda MD       • terazosin (HYTRIN) capsule 5 mg  5 mg Oral Nightly Gonzalez Sauceda MD   5 mg at 10/03/18 2006   • vitamin D3 capsule 5,000 Units  5,000 Units Oral Daily Gonzalez Sauceda MD   5,000 Units at 10/04/18 0914       Physical Exam:   Vital Signs   /55   Pulse 54   Temp 98.3 °F (36.8 °C) (Oral)   Resp 16   Ht 160 cm (63\")   Wt 91.6 kg (202 lb)   SpO2 99%   Breastfeeding? No   BMI 35.78 kg/m²     GENERAL: Awake and alert, " in no acute distress.   HEENT: Normocephalic, atraumatic.  PERRL. EOMI. No conjunctival injection. No icterus.  Dry mucous membranes  NECK: Supple without nuchal rigidity.   HEART: RRR; No murmur.  LUNGS: Clear to auscultation bilaterally without wheezing, rales, rhonchi. Normal respiratory effort. Nonlabored.  ABDOMEN: Obese, Soft,, nondistended. No rebound or guarding.  Not significantly tender.  Former PD catheter site in right upper quadrant is dressed, lean.  EXT: Bilateral lower extremity venous stasis changes.  No edema  :  Without Elizabeth catheter.  MSK: FROM without joint effusions noted arms/legs.    SKIN: Warm and dry without cutaneous eruptions on Inspection/palpation.    NEURO: Oriented to PPT. No focal deficits on motor/sensory exam at arms/legs.  PSYCHIATRIC: Normal insight and judgement. Cooperative with PE    Laboratory Data      Results from last 7 days  Lab Units 10/04/18  0736 10/03/18  1047   WBC 10*3/mm3 8.00 9.31   HEMOGLOBIN g/dL 8.7* 8.8*   HEMATOCRIT % 26.9* 27.3*   PLATELETS 10*3/mm3 257 283       Results from last 7 days  Lab Units 10/04/18  0736   SODIUM mmol/L 138   POTASSIUM mmol/L 3.8   CHLORIDE mmol/L 110*   CO2 mmol/L 20.0   BUN mg/dL 54*   CREATININE mg/dL 3.38*   GLUCOSE mg/dL 100   CALCIUM mg/dL 8.5*     Estimated Creatinine Clearance: 14.3 mL/min (A) (by C-G formula based on SCr of 3.38 mg/dL (H)).    Results from last 7 days  Lab Units 10/03/18  1047   ALK PHOS U/L 41   BILIRUBIN mg/dL 0.5   ALT (SGPT) U/L 30   AST (SGOT) U/L 44*               Microbiology:  Patient says culture from PD catheter tip was done by her local surgeon about 3 weeks prior to admission that was negative     10/3 OR cultures pending: gram stain with GPB   No blood cultures        Radiology:  No radiology results for the last 7 days       Impression:   1. Generalized peritonitis related to infected PD catheter-catheter removed in OR on 10/3, gross purulence noted both from catheter and abdominal cavity.  Cultures are pending.  Gram stain with gram-positive bacilli.  This will likely be a low purulence organism given the chronicity of her presentation.   2. CKD stage 4 - nearing need for dialysis but has not been started on it yet.  3. Bilateral lower extremity venous stasis  - improved from baseline per patient  4. Type 2 diabetes   5. Fibromyalgia - on Plaquenil  6. Sjogren syndrome  7. Anemia of chronic disease  8. History of anaphylaxis with penicillin  9. Possible sulfa allergy    PLAN: Thank you for asking us to see Cleo Hobbs, I recommend the following:   - Follow-up pending cultures from OR on 10/3  - obtain blood cultures if febrile  - start daptomycin 6 mg/kg q48 hr to cover GPB. Vancomycin is usually first line but too high risk given her renal dysfunction  - start levofloxacin for gram negative coverage pending culture results  - baseline CK for AM  - final plan TBD based on above cultures. Antibiotic options are quite limited by allergies and renal function.     ** Update: started levofloxacin and pt says she is allergy. Developed pruritic rash. Infusion stopped after 15 minutes. Will add to allergy list. Replace with aztreonam for gram negative coverage    Sameer Adame MD  10/4/2018

## 2018-10-04 NOTE — CONSULTS
Referring Provider: Dr Komal EVANS.   Reason for Consultation: CKD stag 5     Subjective     Chief complaint PD catheter malfunction    History of present illness:  78 y.o. female with PMH significant for morbid obesity, DM2 with associated nephropathy/CKD5, anemia of chronic disease, and HTN, who recently had PD catheter inserted by Dr Sauceda on 8/13/2018. She has not started dialysis as yet. She has been having problem with dialysis catheter. She was found to have purulent fluid from PD catheter. PD catheter was taken out yesterday. Nephrology service consulted for further management of CKD stage 5.     History  Past Medical History:   Diagnosis Date   • Anemia    • Arthritis    • CKD (chronic kidney disease) stage 4, GFR 15-29 ml/min (CMS/HCC)    • Diabetes mellitus (CMS/HCC)    • Fibromyalgia    • Heart murmur    • Hypertension    • Sjoegren syndrome (CMS/HCC)    ,   Past Surgical History:   Procedure Laterality Date   • ANKLE FUSION     • CATARACT EXTRACTION     • HYSTERECTOMY     • INSERTION PERITONEAL DIALYSIS CATHETER N/A 8/13/2018    Procedure: LAPAROSCOPIC PERITONEAL DIALYSIS CATHETER PLACEMENT;  Surgeon: Gonzalez Sauceda MD;  Location: [facility];  Service: General   , History reviewed. No pertinent family history.,   Social History   Substance Use Topics   • Smoking status: Former Smoker   • Smokeless tobacco: Never Used      Comment: quit age 37   • Alcohol use No   ,   Prescriptions Prior to Admission   Medication Sig Dispense Refill Last Dose   • amLODIPine (NORVASC) 10 MG tablet Take 10 mg by mouth Daily.   10/2/2018 at Unknown time   • aspirin 81 MG EC tablet Take 81 mg by mouth Daily.   10/3/2018 at Unknown time   • Cholecalciferol (VITAMIN D3) 5000 units capsule capsule Take 5,000 Units by mouth Daily.   10/2/2018 at Unknown time   • doxazosin (CARDURA) 4 MG tablet Take 4 mg by mouth Every Night.   10/3/2018 at Unknown time   • estrogens, conjugated, (PREMARIN) 0.625 MG tablet Take 0.625  mg by mouth Daily. Take daily for 21 days then do not take for 7 days.   10/2/2018 at Unknown time   • fenofibrate (TRICOR) 145 MG tablet Take 145 mg by mouth Daily.   10/2/2018 at Unknown time   • hydrochlorothiazide (HYDRODIURIL) 25 MG tablet Take 25 mg by mouth Daily.   10/2/2018 at Unknown time   • hydroxychloroquine (PLAQUENIL) 200 MG tablet Take 200 mg by mouth 2 (Two) Times a Day.   10/2/2018 at Unknown time   • insulin regular (humuLIN R,novoLIN R) 100 UNIT/ML injection Inject  under the skin into the appropriate area as directed 3 (Three) Times a Day Before Meals.   10/2/2018 at Unknown time   , Scheduled Meds:    amLODIPine 10 mg Oral Daily   aspirin 81 mg Oral Daily   famotidine 20 mg Oral Daily   heparin (porcine) 5,000 Units Subcutaneous Q8H   hydrochlorothiazide 25 mg Oral Daily   hydroxychloroquine 200 mg Oral BID   insulin lispro 0-7 Units Subcutaneous 4x Daily With Meals & Nightly   terazosin 5 mg Oral Nightly   vitamin D3 5,000 Units Oral Daily   , Continuous Infusions:   , PRN Meds:  •  acetaminophen **OR** acetaminophen **OR** acetaminophen  •  dextrose  •  dextrose  •  docusate sodium  •  glucagon (human recombinant)  •  HYDROcodone-acetaminophen  •  magnesium hydroxide  •  Morphine **AND** naloxone  •  ondansetron **OR** ondansetron and Allergies:  Penicillins and Sulfa antibiotics    Review of Systems  Pertinent items are noted in HPI    Objective     Vital Signs  Temp:  [96.7 °F (35.9 °C)-98.6 °F (37 °C)] 98.3 °F (36.8 °C)  Heart Rate:  [51-92] 56  Resp:  [16-20] 16  BP: (128-186)/(52-71) 170/52  FiO2 (%):  [30 %] 30 %    No intake/output data recorded.  I/O last 3 completed shifts:  In: 475 [P.O.:75; I.V.:400]  Out: 1200 [Urine:1200]    Physical Exam:     General Appearance:    Alert, cooperative, in no acute distress   Head:    Normocephalic, without obvious abnormality, atraumatic   Eyes:            Lids and lashes normal, conjunctivae and sclerae normal, no   icterus, no pallor, corneas  clear, PERRLA   Ears:    Ears appear intact with no abnormalities noted   Throat:   No oral lesions, no thrush, oral mucosa moist   Neck:   No adenopathy, supple, trachea midline, no thyromegaly, no     carotid bruit, no JVD       Lungs:     Clear to auscultation,respirations regular, even and                   unlabored    Heart:    Regular rhythm and normal rate, normal S1 and S2, no            murmur, no gallop, no rub, no click   Breast Exam:    Deferred   Abdomen:     Normal bowel sounds, no masses, no organomegaly, soft        non-tender, non-distended, no guarding, no rebound                 tenderness   Genitalia:    Deferred   Extremities:   Moves all extremities well, no edema, no cyanosis, no              redness   Pulses:   Pulses palpable and equal bilaterally   Skin:   No bleeding, bruising or rash   Lymph nodes:   No palpable adenopathy   Neurologic:   Cranial nerves 2 - 12 grossly intact, sensation intact, DTR        present and equal bilaterally       Results Review:   I reviewed the patient's new clinical results.    WBC WBC   Date Value Ref Range Status   10/04/2018 8.00 3.50 - 10.80 10*3/mm3 Final   10/03/2018 9.31 3.50 - 10.80 10*3/mm3 Final      HGB Hemoglobin   Date Value Ref Range Status   10/04/2018 8.7 (L) 11.5 - 15.5 g/dL Final   10/03/2018 8.8 (L) 11.5 - 15.5 g/dL Final      HCT Hematocrit   Date Value Ref Range Status   10/04/2018 26.9 (L) 34.5 - 44.0 % Final   10/03/2018 27.3 (L) 34.5 - 44.0 % Final      Platlets No results found for: LABPLAT   MCV MCV   Date Value Ref Range Status   10/04/2018 93.1 80.0 - 99.0 fL Final   10/03/2018 92.9 80.0 - 99.0 fL Final          Sodium Sodium   Date Value Ref Range Status   10/04/2018 138 132 - 146 mmol/L Final   10/03/2018 139 132 - 146 mmol/L Final      Potassium Potassium   Date Value Ref Range Status   10/04/2018 3.8 3.5 - 5.5 mmol/L Final   10/03/2018 3.6 3.5 - 5.5 mmol/L Final      Chloride Chloride   Date Value Ref Range Status   10/04/2018  110 (H) 99 - 109 mmol/L Final   10/03/2018 110 (H) 99 - 109 mmol/L Final      CO2 CO2   Date Value Ref Range Status   10/04/2018 20.0 20.0 - 31.0 mmol/L Final   10/03/2018 18.0 (L) 20.0 - 31.0 mmol/L Final      BUN BUN   Date Value Ref Range Status   10/04/2018 54 (H) 9 - 23 mg/dL Final   10/03/2018 59 (H) 9 - 23 mg/dL Final      Creatinine Creatinine   Date Value Ref Range Status   10/04/2018 3.38 (H) 0.60 - 1.30 mg/dL Final   10/03/2018 3.70 (H) 0.60 - 1.30 mg/dL Final      Calcium Calcium   Date Value Ref Range Status   10/04/2018 8.5 (L) 8.7 - 10.4 mg/dL Final   10/03/2018 9.1 8.7 - 10.4 mg/dL Final      PO4 No results found for: CAPO4   Albumin Albumin   Date Value Ref Range Status   10/03/2018 3.60 3.20 - 4.80 g/dL Final      Magnesium Magnesium   Date Value Ref Range Status   10/04/2018 1.8 1.3 - 2.7 mg/dL Final      Uric Acid No results found for: URICACID           amLODIPine 10 mg Oral Daily   aspirin 81 mg Oral Daily   famotidine 20 mg Oral Daily   heparin (porcine) 5,000 Units Subcutaneous Q8H   hydrochlorothiazide 25 mg Oral Daily   hydroxychloroquine 200 mg Oral BID   insulin lispro 0-7 Units Subcutaneous 4x Daily With Meals & Nightly   terazosin 5 mg Oral Nightly   vitamin D3 5,000 Units Oral Daily          Assessment/Plan     Principal Problem:    Peritonitis due to infected peritoneal dialysis catheter (CMS/[facility])  Active Problems:    Infection    Diabetic nephropathies (CMS/[facility])    HTN (hypertension)    Stage 4 chronic kidney disease (CMS/[facility])    Type 2 diabetes mellitus (CMS/[facility])    Anemia of chronic disease    Morbid obesity (CMS/[facility])    Venous stasis      1- CKD stage V - no signs and symptoms of uremia. Volume status - stable.   2- Peritonitis due to PD catheter.   3- HTN   4- Anemia of chronic disease.     Plan:  - Continue with antibx, follow culture.   - Monitor I/o   - Oral iron supplement.   - Avoid nephrotoxic agents.   - renal diet  - Adjust meds per renal fucntion   - no emergent need of  dialysis. Will closely follow for such needs.     I discussed the patients findings and my recommendations with patient and nursing staff    Sandra Vance MD  10/04/18  @NOW

## 2018-10-04 NOTE — NURSING NOTE
WO nurse consult for bilateral breast excoriation.  Attempted to see patient x2, consulted with ANNELISE Coreas.  Lyudmila will clean with blue wipes and apply Interdry.  If there is an issue or concern, she will call [facility] nurse [phone], No [facility] nurse need at this time.

## 2018-10-04 NOTE — PLAN OF CARE
Problem: Patient Care Overview  Goal: Plan of Care Review  Outcome: Ongoing (interventions implemented as appropriate)   10/04/18 0117   Coping/Psychosocial   Plan of Care Reviewed With patient   OTHER   Outcome Summary patient denies any complaints other than some abd tenderness, offered PRN options patient denied any at this time. patient remains in a SR. Will continue to monitor.   Plan of Care Review   Progress improving     Goal: Individualization and Mutuality  Outcome: Ongoing (interventions implemented as appropriate)   10/04/18 0117   Individualization   Patient Specific Preferences patient prefers the door open, patient is claustrophobic.       Problem: Fall Risk (Adult)  Goal: Identify Related Risk Factors and Signs and Symptoms  Outcome: Ongoing (interventions implemented as appropriate)   10/04/18 0117   Fall Risk (Adult)   Related Risk Factors (Fall Risk) age-related changes;inadequate lighting;fatigue/slow reaction;fear of falling;environment unfamiliar   Signs and Symptoms (Fall Risk) presence of risk factors     Goal: Absence of Fall  Outcome: Ongoing (interventions implemented as appropriate)   10/04/18 0117   Fall Risk (Adult)   Absence of Fall making progress toward outcome       Problem: Infection, Risk/Actual (Adult)  Goal: Identify Related Risk Factors and Signs and Symptoms  Outcome: Ongoing (interventions implemented as appropriate)   10/04/18 0117   Infection, Risk/Actual (Adult)   Related Risk Factors (Infection, Risk/Actual) age extremes;chronic illness/condition;poor personal hygiene;skin integrity impairment;surgery/procedure;tissue perfusion altered   Signs and Symptoms (Infection, Risk/Actual) lab value changes;pain     Goal: Infection Prevention/Resolution  Outcome: Ongoing (interventions implemented as appropriate)   10/04/18 0117   Infection, Risk/Actual (Adult)   Infection Prevention/Resolution making progress toward outcome

## 2018-10-04 NOTE — PROGRESS NOTES
"Discharge Planning Assessment  [facility]     Patient Name: Cleo Hobbs  MRN: [id number]  Today's Date: 10/4/2018    Admit Date: 10/3/2018          Discharge Needs Assessment     Row Name 10/04/18 1607       Living Environment    Lives With spouse    Name(s) of Who Lives With Patient Raven Angel    Current Living Arrangements home/apartment/condo    Primary Care Provided by self    Provides Primary Care For no one    Family Caregiver if Needed spouse    Quality of Family Relationships supportive    Living Arrangement Comments Pt. [place]  She stated it is a 2- story home, but she only uses the first floor.       Resource/Environmental Concerns    Resource/Environmental Concerns none       Transition Planning    Patient/Family Anticipates Transition to home with family    Patient/Family Anticipated Services at Transition none    Transportation Anticipated family or friend will provide       Discharge Needs Assessment    Readmission Within the Last 30 Days no previous admission in last 30 days    Concerns to be Addressed denies needs/concerns at this time;discharge planning    Equipment Currently Used at Home wheelchair;grab bar;shower chair;glucometer    Equipment Needed After Discharge none    Current Discharge Risk physical impairment    Discharge Coordination/Progress Pt. lives in [place], [place] with her spouse. She stated he will provide her with transportation at discharge.  Pt. stated she has sufficient DME.  She uses a wheelchair for distances, but otherwise ambulates around her home independently.  Pt. stated she does not want home health.  She stated \"they won't like the way my house smells\".  She reported having cats in her home.              Discharge Plan     Row Name 10/04/18 1614       Plan    Plan home with spouse    Patient/Family in Agreement with Plan yes    Plan Comments SW met with pt. at bedside.  No family was present.  Pt. denies having " "any discharge needs.  PT services have evaluated pt. and recommend home with home health.  SW will discuss this with pt. again tomorrow.        Destination     No service coordination in this encounter.      Durable Medical Equipment     No service coordination in this encounter.      Dialysis/Infusion     No service coordination in this encounter.      Home Medical Care     No service coordination in this encounter.      Social Care     No service coordination in this encounter.                Demographic Summary     Row Name 10/04/18 1601       General Information    Admission Type observation    Arrived From operating room    Referral Source physician;nursing    Reason for Consult discharge planning    Preferred Language English     Used During This Interaction no    General Information Comments Pt. is followed by Dr. Humphreys in [place], [place].            Functional Status     Row Name 10/04/18 1608       Employment/    Employment/ Comments Pt. has insurance and \"excellent\" prescription coverage through [employer].            Psychosocial    No documentation.           Abuse/Neglect    No documentation.           Legal    No documentation.           Substance Abuse    No documentation.           Patient Forms    No documentation.         MAGGY Toscano    "

## 2018-10-04 NOTE — PROGRESS NOTES
"General Surgery Post op Follow Up Note    Subjective:   Tolerating PO.    /56 (BP Location: Right arm, Patient Position: Sitting)   Pulse 59   Temp 98 °F (36.7 °C) (Oral)   Resp 16   Ht 160 cm (63\")   Wt 91.6 kg (202 lb)   SpO2 96%   Breastfeeding? No   BMI 35.78 kg/m²     General Appearance:  in no acute distress  Abdomen: incision noted.    CBC    Results from last 7 days  Lab Units 10/04/18  0736   WBC 10*3/mm3 8.00   HEMOGLOBIN g/dL 8.7*   HEMATOCRIT % 26.9*   PLATELETS 10*3/mm3 257       CMP/BMP    Results from last 7 days  Lab Units 10/04/18  0736 10/03/18  1047   SODIUM mmol/L 138 139   POTASSIUM mmol/L 3.8 3.6   CHLORIDE mmol/L 110* 110*   CO2 mmol/L 20.0 18.0*   BUN mg/dL 54* 59*   CREATININE mg/dL 3.38* 3.70*   CALCIUM mg/dL 8.5* 9.1   BILIRUBIN mg/dL  --  0.5   ALK PHOS U/L  --  41   ALT (SGPT) U/L  --  30   AST (SGOT) U/L  --  44*   GLUCOSE mg/dL 100 74         ASSESSMENT/PLAN:    Clean the incision with Betadine.  Tolerating PO.  Abx per ID.    Gonzalez Sauceda MD  10/4/2018  5:41 PM  "

## 2018-10-05 LAB
BACTERIA SPEC AEROBE CULT: ABNORMAL
CK SERPL-CCNC: 86 U/L (ref 26–174)
GLUCOSE BLDC GLUCOMTR-MCNC: 112 MG/DL (ref 70–130)
GLUCOSE BLDC GLUCOMTR-MCNC: 138 MG/DL (ref 70–130)
GLUCOSE BLDC GLUCOMTR-MCNC: 185 MG/DL (ref 70–130)
GLUCOSE BLDC GLUCOMTR-MCNC: 196 MG/DL (ref 70–130)
GRAM STN SPEC: ABNORMAL
GRAM STN SPEC: ABNORMAL

## 2018-10-05 PROCEDURE — 82962 GLUCOSE BLOOD TEST: CPT

## 2018-10-05 PROCEDURE — 25010000002 HEPARIN (PORCINE) PER 1000 UNITS: Performed by: SURGERY

## 2018-10-05 PROCEDURE — G0378 HOSPITAL OBSERVATION PER HR: HCPCS

## 2018-10-05 PROCEDURE — 82550 ASSAY OF CK (CPK): CPT | Performed by: INTERNAL MEDICINE

## 2018-10-05 RX ADMIN — TERAZOSIN HYDROCHLORIDE 5 MG: 5 CAPSULE ORAL at 21:47

## 2018-10-05 RX ADMIN — ASPIRIN 81 MG: 81 TABLET, COATED ORAL at 08:19

## 2018-10-05 RX ADMIN — HEPARIN SODIUM 5000 UNITS: 5000 INJECTION, SOLUTION INTRAVENOUS; SUBCUTANEOUS at 05:11

## 2018-10-05 RX ADMIN — Medication 2 TABLET: at 08:18

## 2018-10-05 RX ADMIN — POVIDONE-IODINE: 100 OINTMENT TOPICAL at 21:53

## 2018-10-05 RX ADMIN — INSULIN LISPRO 2 UNITS: 100 INJECTION, SOLUTION INTRAVENOUS; SUBCUTANEOUS at 21:48

## 2018-10-05 RX ADMIN — AZTREONAM 1 G: 1 INJECTION, POWDER, LYOPHILIZED, FOR SOLUTION INTRAMUSCULAR; INTRAVENOUS at 00:09

## 2018-10-05 RX ADMIN — AZTREONAM 1 G: 1 INJECTION, POWDER, LYOPHILIZED, FOR SOLUTION INTRAMUSCULAR; INTRAVENOUS at 23:56

## 2018-10-05 RX ADMIN — HEPARIN SODIUM 5000 UNITS: 5000 INJECTION, SOLUTION INTRAVENOUS; SUBCUTANEOUS at 14:37

## 2018-10-05 RX ADMIN — INSULIN LISPRO 2 UNITS: 100 INJECTION, SOLUTION INTRAVENOUS; SUBCUTANEOUS at 11:53

## 2018-10-05 RX ADMIN — HEPARIN SODIUM 5000 UNITS: 5000 INJECTION, SOLUTION INTRAVENOUS; SUBCUTANEOUS at 21:47

## 2018-10-05 RX ADMIN — Medication 5000 UNITS: at 08:19

## 2018-10-05 RX ADMIN — HYDROXYCHLOROQUINE SULFATE 200 MG: 200 TABLET, FILM COATED ORAL at 21:47

## 2018-10-05 RX ADMIN — HYDROCHLOROTHIAZIDE 25 MG: 25 TABLET ORAL at 08:19

## 2018-10-05 RX ADMIN — FAMOTIDINE 20 MG: 20 TABLET ORAL at 08:19

## 2018-10-05 RX ADMIN — AMLODIPINE BESYLATE 10 MG: 10 TABLET ORAL at 08:19

## 2018-10-05 RX ADMIN — AZTREONAM 1 G: 1 INJECTION, POWDER, LYOPHILIZED, FOR SOLUTION INTRAMUSCULAR; INTRAVENOUS at 11:53

## 2018-10-05 RX ADMIN — DOCUSATE SODIUM 100 MG: 100 CAPSULE, LIQUID FILLED ORAL at 08:18

## 2018-10-05 RX ADMIN — POVIDONE-IODINE: 100 OINTMENT TOPICAL at 10:00

## 2018-10-05 RX ADMIN — HYDROXYCHLOROQUINE SULFATE 200 MG: 200 TABLET, FILM COATED ORAL at 11:53

## 2018-10-05 NOTE — PROGRESS NOTES
"   LOS: 0 days    Patient Care Team:  Provider, No Known as PCP - General    Chief Complaint:  PD catheter dysfunction.    Subjective   No acute events overnight. No new complaints.   Review of Systems:   No CP or SOA    Objective     Vital Sign Min/Max for last 24 hours  Temp  Min: 98 °F (36.7 °C)  Max: 99 °F (37.2 °C)   BP  Min: 142/56  Max: 161/56   Pulse  Min: 54  Max: 72   Resp  Min: 16  Max: 16   SpO2  Min: 96 %  Max: 100 %   No Data Recorded   No Data Recorded     Flowsheet Rows      First Filed Value   Admission Height  160 cm (63\") Documented at 10/03/2018 1058   Admission Weight  91.6 kg (202 lb) Documented at 10/03/2018 1058          No intake/output data recorded.  I/O last 3 completed shifts:  In: 0   Out: 2100 [Urine:2100]    Physical Exam:     General Appearance:    Alert, cooperative, in no acute distress   Head:    Normocephalic, without obvious abnormality, atraumatic               Neck:   No adenopathy, supple, trachea midline, no thyromegaly, no     carotid bruit, no JVD       Lungs:     Clear to auscultation,respirations regular, even and                   unlabored    Heart:    Regular rhythm and normal rate, normal S1 and S2, no            murmur, no gallop, no rub, no click       Abdomen:     Normal bowel sounds, no masses, no organomegaly, soft        non-tender, non-distended, no guarding, no rebound                 tenderness       Extremities:   Moves all extremities well, no edema, no cyanosis, no              redness               Neurologic:   Cranial nerves 2 - 12 grossly intact, sensation intact, DTR        present and equal bilaterally       WBC WBC   Date Value Ref Range Status   10/04/2018 8.00 3.50 - 10.80 10*3/mm3 Final   10/03/2018 9.31 3.50 - 10.80 10*3/mm3 Final      HGB Hemoglobin   Date Value Ref Range Status   10/04/2018 8.7 (L) 11.5 - 15.5 g/dL Final   10/03/2018 8.8 (L) 11.5 - 15.5 g/dL Final      HCT Hematocrit   Date Value Ref Range Status   10/04/2018 26.9 (L) 34.5 - " 44.0 % Final   10/03/2018 27.3 (L) 34.5 - 44.0 % Final      Platlets No results found for: LABPLAT   MCV MCV   Date Value Ref Range Status   10/04/2018 93.1 80.0 - 99.0 fL Final   10/03/2018 92.9 80.0 - 99.0 fL Final          Sodium Sodium   Date Value Ref Range Status   10/04/2018 138 132 - 146 mmol/L Final   10/03/2018 139 132 - 146 mmol/L Final      Potassium Potassium   Date Value Ref Range Status   10/04/2018 3.8 3.5 - 5.5 mmol/L Final   10/03/2018 3.6 3.5 - 5.5 mmol/L Final      Chloride Chloride   Date Value Ref Range Status   10/04/2018 110 (H) 99 - 109 mmol/L Final   10/03/2018 110 (H) 99 - 109 mmol/L Final      CO2 CO2   Date Value Ref Range Status   10/04/2018 20.0 20.0 - 31.0 mmol/L Final   10/03/2018 18.0 (L) 20.0 - 31.0 mmol/L Final      BUN BUN   Date Value Ref Range Status   10/04/2018 54 (H) 9 - 23 mg/dL Final   10/03/2018 59 (H) 9 - 23 mg/dL Final      Creatinine Creatinine   Date Value Ref Range Status   10/04/2018 3.38 (H) 0.60 - 1.30 mg/dL Final   10/03/2018 3.70 (H) 0.60 - 1.30 mg/dL Final      Calcium Calcium   Date Value Ref Range Status   10/04/2018 8.5 (L) 8.7 - 10.4 mg/dL Final   10/03/2018 9.1 8.7 - 10.4 mg/dL Final      PO4 No results found for: CAPO4   Albumin Albumin   Date Value Ref Range Status   10/03/2018 3.60 3.20 - 4.80 g/dL Final      Magnesium Magnesium   Date Value Ref Range Status   10/04/2018 1.8 1.3 - 2.7 mg/dL Final      Uric Acid No results found for: URICACID        Results Review:     I reviewed the patient's new clinical results.      amLODIPine 10 mg Oral Daily   aspirin 81 mg Oral Daily   aztreonam 1 g Intravenous Q12H   DAPTOmycin 6 mg/kg Intravenous Q48H   docusate sodium 100 mg Oral Daily   famotidine 20 mg Oral Daily   heparin (porcine) 5,000 Units Subcutaneous Q8H   hydrochlorothiazide 25 mg Oral Daily   hydroxychloroquine 200 mg Oral BID   insulin lispro 0-7 Units Subcutaneous 4x Daily With Meals & Nightly   povidone-iodine  Topical Q12H   terazosin 5 mg Oral  Nightly   vitamin D3 5,000 Units Oral Daily          Medication Review:     Assessment/Plan     Principal Problem:    Peritonitis due to infected peritoneal dialysis catheter (CMS/HCC)  Active Problems:    Infection    Diabetic nephropathies (CMS/HCC)    HTN (hypertension)    Stage 4 chronic kidney disease (CMS/HCC)    Type 2 diabetes mellitus (CMS/HCC)    Anemia of chronic disease    Morbid obesity (CMS/HCC)    Venous stasis    1- CKD stage V - no signs and symptoms of uremia. Volume status - stable.   2- Peritonitis due to PD catheter.   3- HTN   4- Anemia of chronic disease.      Plan:  - Continue with antibx, follow culture.   - Monitor I/o   - Oral iron supplement.   - Avoid nephrotoxic agents.   - renal diet  - Adjust meds per renal fucntion   - no emergent need of dialysis. Will closely follow for such needs.      I discussed the patients findings and my recommendations with patient and nursing staff    Sandra Vance MD  10/05/18  9:23 AM

## 2018-10-05 NOTE — PLAN OF CARE
Problem: Patient Care Overview  Goal: Plan of Care Review  Outcome: Ongoing (interventions implemented as appropriate)   10/05/18 0237   Coping/Psychosocial   Plan of Care Reviewed With patient   OTHER   Outcome Summary patient denies any complaints at this time. patient slept some in the recliner and now in the bed appears to be resting well at this time. Will continue to monitor.   Plan of Care Review   Progress improving     Goal: Individualization and Mutuality  Outcome: Ongoing (interventions implemented as appropriate)   10/05/18 0237   Individualization   Patient Specific Preferences patient likes the door open       Problem: Fall Risk (Adult)  Goal: Identify Related Risk Factors and Signs and Symptoms  Outcome: Ongoing (interventions implemented as appropriate)   10/05/18 0237   Fall Risk (Adult)   Related Risk Factors (Fall Risk) age-related changes;fear of falling;history of falls;gait/mobility problems;environment unfamiliar   Signs and Symptoms (Fall Risk) presence of risk factors     Goal: Absence of Fall  Outcome: Ongoing (interventions implemented as appropriate)   10/05/18 0237   Fall Risk (Adult)   Absence of Fall making progress toward outcome       Problem: Infection, Risk/Actual (Adult)  Goal: Identify Related Risk Factors and Signs and Symptoms  Outcome: Ongoing (interventions implemented as appropriate)   10/05/18 0237   Infection, Risk/Actual (Adult)   Related Risk Factors (Infection, Risk/Actual) chronic illness/condition;exposure to microbes;immunosuppressed;prolonged hospitalization;tissue perfusion altered   Signs and Symptoms (Infection, Risk/Actual) drainage;pain

## 2018-10-05 NOTE — PROGRESS NOTES
Infectious Disease Progress Note  Cleo Hobbs  1940  [id number]    Admission Date: 10/3/2018    Requesting Provider: Gonzalez Sauceda, *  Evaluating Physician: Sameer Adame MD    Chief Complaint: abdominal pain    Reason for Consultation: peritonitis due to infected PD catheter    History of present illness:   Patient is a 78 y.o.  Yr old female with history of CKD4, DM2 and Sjogrens.  On August 13 she had placement of a peritoneal dialysis catheter by Dr. Sauceda, in preparation for likely dialysis.  The catheter was in place for 4 weeks before it was flushed for the first time.  It worked the first week it was, but the second week no fluid was returned after it was infused.  She subsequently had several manipulations to try to fix the catheter ultimately nothing worked.  There was some concern for catheter infection due to abdominal pain and some local erythema around the insertion site.  There is also some white mucoid drainage from around the base of the catheter.  She is brought to the OR on 10/13 for planned repositioning of the catheter but intraoperatively she was noted to have purulent material both in the catheter and within the abdominal cavity itself.  Thus the catheter was removed and cultures were taken.  She has been started on IV clindamycin due to her antibiotic allergies.  She has not noticed any fevers throughout this whole episode that may have had some chills.  She was not been on any antibiotics for the past 6 weeks prior to admission.  Right before she had a peritoneal dialysis catheter placed she was diagnosed with a UTI and treated with an unknown antibiotic.  She complains of right-sided abdominal pain.  She has chronic, unchanged back pain.  Denies having any diarrhea.  She is urinating normally.    10/4: Afebrile since admission.  Hemodynamic stable.  She is on clindamycin which is well tolerated thus far.  Her abdominal pain is improved but not resolved.  PD catheter  "removed in OR 10/3 and insertion site is currently dressed.    10/5: started on daptomycin and levofloxacin. Although I asked her about prior antibiotic allergy she failed to mention prior reaction to levofloxacin. She broke in a rash soon after it was started. Given benadryl and infusion stopped. Now rash and pruritis have resolved. Otherwise stable overnight. Afebrile. Less abdominal pain, but still some discomfort at site where catheter was removed.       Review of Systems  ROS:  No fevers or chills. No n/v/d. Legs more swollen after sitting in chair all day, but not more painful. 12 point ROS, otherwise negative except for HPI    Allergies   Allergen Reactions   • Penicillins Anaphylaxis   • Levaquin [Levofloxacin] Itching   • Sulfa Antibiotics Unknown (See Comments)     PT STATES IT \"MAKES HER SICK\" hurts her back   Penicillin caused anaphylaxis as a young adult.  She is also had a rash with Keflex.  She took Bactrim for a UTI and she thinks it caused her to have back pain but no other allergic symptoms  Rash with levofloxacin.     Antibiotics:  Anti-Infectives     Ordered     Dose/Rate Route Frequency Start Stop    10/04/18 1501  aztreonam (AZACTAM) 1 g in sodium chloride 0.9 % 100 mL IVPB-MBP     Ordering Provider:  Sameer Adame MD    1 g  over 4 Hours Intravenous Every 12 Hours 10/05/18 0000 10/11/18 2359    10/04/18 1501  aztreonam (AZACTAM) 1 g in sodium chloride 0.9 % 100 mL IVPB-MBP     Ordering Provider:  Sameer Adame MD    1 g  200 mL/hr over 30 Minutes Intravenous Once 10/04/18 1545 10/04/18 1700    10/04/18 1141  DAPTOmycin (CUBICIN) 550 mg in sodium chloride 0.9 % 50 mL IVPB     Ordering Provider:  Sameer Adame MD    6 mg/kg × 91.6 kg  100 mL/hr over 30 Minutes Intravenous Every 48 Hours 10/04/18 1300 10/12/18 1259    10/04/18 1141  levoFLOXacin (LEVAQUIN) 750 mg/150 mL D5W (premix) (LEVAQUIN) 750 mg     Ordering Provider:  Sameer Adame MD    750 mg Intravenous Once " 10/04/18 1300 10/04/18 1447    10/03/18 1711  clindamycin (CLEOCIN) 600 mg in dextrose 5% 50 mL IVPB (premix)     Ordering Provider:  Muriel Soto MD    600 mg Intravenous Every 8 Hours 10/03/18 2153 10/04/18 0745    10/03/18 1553  hydroxychloroquine (PLAQUENIL) tablet 200 mg     Ordering Provider:  Gonzalez Sauceda MD    200 mg Oral 2 Times Daily 10/03/18 2100            Other Medications:  Current Facility-Administered Medications   Medication Dose Route Frequency Provider Last Rate Last Dose   • acetaminophen (TYLENOL) tablet 650 mg  650 mg Oral Q4H PRN Gonzalez Sauceda MD        Or   • acetaminophen (TYLENOL) 160 MG/5ML solution 650 mg  650 mg Oral Q4H PRN Gonzalez Sauceda MD        Or   • acetaminophen (TYLENOL) suppository 650 mg  650 mg Rectal Q4H PRN Gonzalez Sauceda MD       • amLODIPine (NORVASC) tablet 10 mg  10 mg Oral Daily Gonzalez Sauceda MD   10 mg at 10/05/18 0819   • aspirin EC tablet 81 mg  81 mg Oral Daily Gonzalez Sauceda MD   81 mg at 10/05/18 0819   • aztreonam (AZACTAM) 1 g in sodium chloride 0.9 % 100 mL IVPB-MBP  1 g Intravenous Q12H Sameer Adame MD 0 mL/hr at 10/05/18 0504 1 g at 10/05/18 1407   • bisacodyl (DULCOLAX) EC tablet 10 mg  10 mg Oral Daily PRN Herlinda Buckley APRN       • DAPTOmycin (CUBICIN) 550 mg in sodium chloride 0.9 % 50 mL IVPB  6 mg/kg Intravenous Q48H Sameer Adame MD   Stopped at 10/04/18 1405   • dextrose (D50W) 25 g/ 50mL Intravenous Solution 25 g  25 g Intravenous Q15 Min PRN Herlinda Buckley APRN       • dextrose (GLUTOSE) oral gel 15 g  15 g Oral Q15 Min PRN Herlinda Buckley, APRN       • docusate sodium (COLACE) capsule 100 mg  100 mg Oral BID PRN Gonzalez Sauceda MD       • docusate sodium (COLACE) capsule 100 mg  100 mg Oral Daily Herlinda Buckley APRN   100 mg at 10/05/18 0818   • famotidine (PEPCID) tablet 20 mg  20 mg Oral Daily Muriel Soto MD   20 mg  "at 10/05/18 0819   • glucagon (human recombinant) (GLUCAGEN DIAGNOSTIC) injection 1 mg  1 mg Subcutaneous PRN Herlinda Buckley APRN       • heparin (porcine) 5000 UNIT/ML injection 5,000 Units  5,000 Units Subcutaneous Q8H Gonzalez Sauceda MD   5,000 Units at 10/05/18 1437   • hydrochlorothiazide (HYDRODIURIL) tablet 25 mg  25 mg Oral Daily Gonzalez Sauceda MD   25 mg at 10/05/18 0819   • HYDROcodone-acetaminophen (NORCO) 5-325 MG per tablet 1 tablet  1 tablet Oral Q4H PRN Gonzalez Sauceda MD       • hydroxychloroquine (PLAQUENIL) tablet 200 mg  200 mg Oral BID Gonzalez Sauceda MD   200 mg at 10/05/18 1153   • insulin lispro (humaLOG) injection 0-7 Units  0-7 Units Subcutaneous 4x Daily With Meals & Nightly Herlinda Buckley APRN   2 Units at 10/05/18 1153   • magnesium hydroxide (MILK OF MAGNESIA) suspension 2400 mg/10mL 10 mL  10 mL Oral Daily PRN Gonzalez Sauceda MD       • Morphine sulfate (PF) injection 2 mg  2 mg Intravenous Q2H PRN Gonzalez Sauceda MD        And   • naloxone (NARCAN) injection 0.4 mg  0.4 mg Intravenous Q5 Min PRN Gonzalez Sauceda MD       • ondansetron (ZOFRAN) tablet 4 mg  4 mg Oral Q6H PRN Gonzalez Sauceda MD        Or   • ondansetron (ZOFRAN) injection 4 mg  4 mg Intravenous Q6H PRN Gonzalez Sauceda MD       • povidone-iodine (BETADINE) ointment   Topical Q12H Gonzalez Sauceda MD       • sennosides-docusate sodium (SENOKOT-S) 8.6-50 MG tablet 2 tablet  2 tablet Oral Nightly PRN Herlinda Buckley APRN   2 tablet at 10/05/18 0818   • terazosin (HYTRIN) capsule 5 mg  5 mg Oral Nightly Gonzalez Sauceda MD   5 mg at 10/04/18 2132   • vitamin D3 capsule 5,000 Units  5,000 Units Oral Daily Gonzalez Sauceda MD   5,000 Units at 10/05/18 0819       Physical Exam:   Vital Signs   /64   Pulse 57   Temp 98.5 °F (36.9 °C) (Oral)   Resp 16   Ht 160 cm (63\")   Wt 91.6 kg (202 lb)   SpO2 100%   " Breastfeeding? No   BMI 35.78 kg/m²     GENERAL: Awake and alert, in no acute distress.   HEENT: Normocephalic, atraumatic.  PERRL. EOMI. No conjunctival injection. No icterus.  Dry mucous membranes  HEART: RRR; No murmur.  LUNGS: Clear to auscultation bilaterally without wheezing, rales, rhonchi. Normal respiratory effort. Nonlabored.  ABDOMEN: Obese, Soft,, nondistended. No rebound or guarding. Former PD catheter site in right upper quadrant is dressed, clean. Slight tenderness near dressing  EXT: Bilateral lower extremity venous stasis changes.  No edema  :  Without Elizabeth catheter.  MSK: bilateral lower extremity edema with chronic venous stasis changed on legs.  SKIN: Warm and dry without cutaneous eruptions on Inspection/palpation.    NEURO: Oriented to PPT. No focal deficits on motor/sensory exam at arms/legs.  PSYCHIATRIC: Normal insight and judgement. Cooperative with PE    Laboratory Data      Results from last 7 days  Lab Units 10/04/18  0736 10/03/18  1047   WBC 10*3/mm3 8.00 9.31   HEMOGLOBIN g/dL 8.7* 8.8*   HEMATOCRIT % 26.9* 27.3*   PLATELETS 10*3/mm3 257 283       Results from last 7 days  Lab Units 10/04/18  0736   SODIUM mmol/L 138   POTASSIUM mmol/L 3.8   CHLORIDE mmol/L 110*   CO2 mmol/L 20.0   BUN mg/dL 54*   CREATININE mg/dL 3.38*   GLUCOSE mg/dL 100   CALCIUM mg/dL 8.5*     Estimated Creatinine Clearance: 14.3 mL/min (A) (by C-G formula based on SCr of 3.38 mg/dL (H)).    Results from last 7 days  Lab Units 10/03/18  1047   ALK PHOS U/L 41   BILIRUBIN mg/dL 0.5   ALT (SGPT) U/L 30   AST (SGOT) U/L 44*               CK 86    Microbiology:  Patient says culture from PD catheter tip was done by her local surgeon about 3 weeks prior to admission that was negative     10/3 OR cultures pending: gram stain with GPB, culture growing Corynebacterium. Requested isolate be sent to  for MICs on 10/5  Anaerobe, fungal and AFB cultures pending    No blood cultures       Radiology:  No radiology results  for the last 7 days       Impression:   1. Generalized peritonitis related to infected PD catheter, due to Corynebacterium-catheter removed in OR on 10/3, gross purulence noted both from catheter and abdominal cavity. Cultures only growing Corynebacterium so far, but anaerobes, fungal AFB pending.  This will likely be a low purulence organism given the chronicity of her presentation, so Corynebacterium alone makes sense.   2. CKD stage 4 - nearing need for dialysis but has not been started on it yet.  3. Bilateral lower extremity venous stasis  - improved from baseline per patient  4. Type 2 diabetes   5. Fibromyalgia - on Plaquenil  6. Sjogren syndrome  7. Anemia of chronic disease  8. History of anaphylaxis with penicillin  9. Possible sulfa allergy  10. Levofloxacin allergy - added to list    PLAN: Thank you for asking us to see Cleo Hobbs, I recommend the following:  - Follow-up pending cultures from OR on 10/3. Including AFB, Fungal and anaerobe  - I called lab and requested MICs be done which is a send out to . This will be important in order find a reliable oral antibiotic option.   - continue IV daptomycin 6 mg/kg q48 hr to cover corynebacterium. Vancomycin is usually first line but too high risk given her renal dysfunction  - continue aztreonam (renally dosed) but can stop if no gram negatives identified when bacterial cultures finalize    - discussed antibiotic options with patient at length today.  Antibiotic options are quite limited by allergies and renal function.  She is also apprehensive about home health, but is agreeable if needed. Home IV antibiotics would required placement of tunneled line. She [place] over [place] miles away so coming to our office for infusions will be challenging, although it could spare her needing a tunneled line.  - Additionally complicating her case is th only reliable empiric option for Corynebacterium is vancomycin and possibly daptomycin.  - In my opinion the safest  option is to have her remain in the hospital over the weekend to continue getting daptomycin and allow cultures to finalize.   - On Monday we can re-evaluate culture data and potentially consider having her come to our office for a few more infusions of IV daptomycin while we await corynebacterium MICs.   - plan for 2-3 week course of antibiotics from date of catheter removal. Possibly one week IV daptomycin followed by linezolid    I spent greater than 35 minutes caring for Cleo Hobbs today with >50% of the time spent counseling and coordinating care. Discussed with patient, RN and Dr Komal Adame MD  10/5/2018

## 2018-10-05 NOTE — SIGNIFICANT NOTE
Pt tells RN wants her dulcolax she takes at home. Sounds like she is overusing possibly, takes 5 tabs of laxatives at night.    Added bowel regiment.

## 2018-10-05 NOTE — PROGRESS NOTES
"General Surgery Post op Follow Up Note    Subjective:   No new complaints.    /64 (BP Location: Right arm, Patient Position: Lying)   Pulse 63   Temp 97.7 °F (36.5 °C) (Oral)   Resp 18   Ht 160 cm (63\")   Wt 91.6 kg (202 lb)   SpO2 100%   Breastfeeding? No   BMI 35.78 kg/m²     General Appearance:  in no acute distress  Abdomen: incision is edematous. No expressible drainage.    CBC    Results from last 7 days  Lab Units 10/04/18  0736   WBC 10*3/mm3 8.00   HEMOGLOBIN g/dL 8.7*   HEMATOCRIT % 26.9*   PLATELETS 10*3/mm3 257       CMP/BMP    Results from last 7 days  Lab Units 10/04/18  0736 10/03/18  1047   SODIUM mmol/L 138 139   POTASSIUM mmol/L 3.8 3.6   CHLORIDE mmol/L 110* 110*   CO2 mmol/L 20.0 18.0*   BUN mg/dL 54* 59*   CREATININE mg/dL 3.38* 3.70*   CALCIUM mg/dL 8.5* 9.1   BILIRUBIN mg/dL  --  0.5   ALK PHOS U/L  --  41   ALT (SGPT) U/L  --  30   AST (SGOT) U/L  --  44*   GLUCOSE mg/dL 100 74         ASSESSMENT/PLAN:    PD catheter infection:  Clean the sites with Betadine.  Nephrology and ID following along.    Gonzalez Sauceda MD  10/5/2018  5:11 PM  "

## 2018-10-05 NOTE — PLAN OF CARE
Problem: Patient Care Overview  Goal: Plan of Care Review   10/05/18 1401   OTHER   Outcome Summary no c/o pain. vss. afebrile. up with one assist to bathroom. tolerating diet well       Problem: Fall Risk (Adult)  Goal: Identify Related Risk Factors and Signs and Symptoms  Outcome: Ongoing (interventions implemented as appropriate)   10/05/18 1401   Fall Risk (Adult)   Related Risk Factors (Fall Risk) age-related changes;fatigue/slow reaction;environment unfamiliar   Signs and Symptoms (Fall Risk) presence of risk factors       Problem: Infection, Risk/Actual (Adult)  Goal: Identify Related Risk Factors and Signs and Symptoms  Outcome: Ongoing (interventions implemented as appropriate)   10/05/18 1401   Infection, Risk/Actual (Adult)   Related Risk Factors (Infection, Risk/Actual) prolonged hospitalization;immunosuppressed;skin integrity impairment;tissue perfusion altered   Signs and Symptoms (Infection, Risk/Actual) lab value changes

## 2018-10-05 NOTE — PROGRESS NOTES
Continued Stay Note  [facility]     Patient Name: Cleo Hobbs  MRN: [id number]  Today's Date: 10/5/2018    Admit Date: 10/3/2018          Discharge Plan     Row Name 10/05/18 5510       Plan    Plan Declines D/C services    Plan Comments SW met with pt. to discuss discharge planning.  SW discussed PT recommendations for home health.  Pt. refuses home health services at this time.  Pt. denies having any discharge needs.  Weekend CM will be available over the weekend to assist with discharge planning.    Final Discharge Disposition Code 01 - home or self-care              Discharge Codes    No documentation.           MAGGY Toscano

## 2018-10-06 LAB
GLUCOSE BLDC GLUCOMTR-MCNC: 103 MG/DL (ref 70–130)
GLUCOSE BLDC GLUCOMTR-MCNC: 139 MG/DL (ref 70–130)
GLUCOSE BLDC GLUCOMTR-MCNC: 144 MG/DL (ref 70–130)
GLUCOSE BLDC GLUCOMTR-MCNC: 176 MG/DL (ref 70–130)

## 2018-10-06 PROCEDURE — 25010000002 HEPARIN (PORCINE) PER 1000 UNITS: Performed by: SURGERY

## 2018-10-06 PROCEDURE — 25010000002 DAPTOMYCIN PER 1 MG: Performed by: INTERNAL MEDICINE

## 2018-10-06 PROCEDURE — G0378 HOSPITAL OBSERVATION PER HR: HCPCS

## 2018-10-06 PROCEDURE — 82962 GLUCOSE BLOOD TEST: CPT

## 2018-10-06 RX ADMIN — INSULIN LISPRO 2 UNITS: 100 INJECTION, SOLUTION INTRAVENOUS; SUBCUTANEOUS at 20:58

## 2018-10-06 RX ADMIN — POVIDONE-IODINE: 100 OINTMENT TOPICAL at 17:56

## 2018-10-06 RX ADMIN — AZTREONAM 1 G: 1 INJECTION, POWDER, LYOPHILIZED, FOR SOLUTION INTRAMUSCULAR; INTRAVENOUS at 12:00

## 2018-10-06 RX ADMIN — DAPTOMYCIN 550 MG: 500 INJECTION, POWDER, LYOPHILIZED, FOR SOLUTION INTRAVENOUS at 14:12

## 2018-10-06 RX ADMIN — HYDROXYCHLOROQUINE SULFATE 200 MG: 200 TABLET, FILM COATED ORAL at 09:23

## 2018-10-06 RX ADMIN — HEPARIN SODIUM 5000 UNITS: 5000 INJECTION, SOLUTION INTRAVENOUS; SUBCUTANEOUS at 06:15

## 2018-10-06 RX ADMIN — HYDROCHLOROTHIAZIDE 25 MG: 25 TABLET ORAL at 09:24

## 2018-10-06 RX ADMIN — FAMOTIDINE 20 MG: 20 TABLET ORAL at 09:23

## 2018-10-06 RX ADMIN — HEPARIN SODIUM 5000 UNITS: 5000 INJECTION, SOLUTION INTRAVENOUS; SUBCUTANEOUS at 14:13

## 2018-10-06 RX ADMIN — POVIDONE-IODINE: 100 OINTMENT TOPICAL at 21:01

## 2018-10-06 RX ADMIN — Medication 5000 UNITS: at 09:23

## 2018-10-06 RX ADMIN — HEPARIN SODIUM 5000 UNITS: 5000 INJECTION, SOLUTION INTRAVENOUS; SUBCUTANEOUS at 20:59

## 2018-10-06 RX ADMIN — TERAZOSIN HYDROCHLORIDE 5 MG: 5 CAPSULE ORAL at 20:59

## 2018-10-06 RX ADMIN — DOCUSATE SODIUM 100 MG: 100 CAPSULE, LIQUID FILLED ORAL at 09:24

## 2018-10-06 RX ADMIN — ASPIRIN 81 MG: 81 TABLET, COATED ORAL at 09:23

## 2018-10-06 RX ADMIN — HYDROXYCHLOROQUINE SULFATE 200 MG: 200 TABLET, FILM COATED ORAL at 20:59

## 2018-10-06 RX ADMIN — AMLODIPINE BESYLATE 10 MG: 10 TABLET ORAL at 09:24

## 2018-10-06 NOTE — PROGRESS NOTES
"   LOS: 0 days    Patient Care Team:  Provider, No Known as PCP - General    Chief Complaint:  PD catheter dysfunction.    Subjective   No acute events overnight. No new complaints.   Review of Systems:   No CP or SOA    Objective     Vital Sign Min/Max for last 24 hours  Temp  Min: 97.5 °F (36.4 °C)  Max: 98.2 °F (36.8 °C)   BP  Min: 128/64  Max: 193/73   Pulse  Min: 63  Max: 71   Resp  Min: 18  Max: 18   SpO2  Min: 98 %  Max: 100 %   No Data Recorded   No Data Recorded     Flowsheet Rows      First Filed Value   Admission Height  160 cm (63\") Documented at 10/03/2018 1058   Admission Weight  91.6 kg (202 lb) Documented at 10/03/2018 1058          No intake/output data recorded.  I/O last 3 completed shifts:  In: 100 [IV Piggyback:100]  Out: 1500 [Urine:1500]    Physical Exam:     General Appearance:    Alert, cooperative, in no acute distress   Head:    Normocephalic, without obvious abnormality, atraumatic               Neck:   No adenopathy, supple, trachea midline, no thyromegaly, no     carotid bruit, no JVD       Lungs:     Clear to auscultation,respirations regular, even and                   unlabored    Heart:    Regular rhythm and normal rate, normal S1 and S2, no            murmur, no gallop, no rub, no click       Abdomen:     Normal bowel sounds, no masses, no organomegaly, soft        non-tender, non-distended, no guarding, no rebound                 tenderness       Extremities:   Moves all extremities well, no edema, no cyanosis, no              redness               Neurologic:   Cranial nerves 2 - 12 grossly intact, sensation intact, DTR        present and equal bilaterally       WBC WBC   Date Value Ref Range Status   10/04/2018 8.00 3.50 - 10.80 10*3/mm3 Final   10/03/2018 9.31 3.50 - 10.80 10*3/mm3 Final      HGB Hemoglobin   Date Value Ref Range Status   10/04/2018 8.7 (L) 11.5 - 15.5 g/dL Final   10/03/2018 8.8 (L) 11.5 - 15.5 g/dL Final      HCT Hematocrit   Date Value Ref Range Status "   10/04/2018 26.9 (L) 34.5 - 44.0 % Final   10/03/2018 27.3 (L) 34.5 - 44.0 % Final      Platlets No results found for: LABPLAT   MCV MCV   Date Value Ref Range Status   10/04/2018 93.1 80.0 - 99.0 fL Final   10/03/2018 92.9 80.0 - 99.0 fL Final          Sodium Sodium   Date Value Ref Range Status   10/04/2018 138 132 - 146 mmol/L Final   10/03/2018 139 132 - 146 mmol/L Final      Potassium Potassium   Date Value Ref Range Status   10/04/2018 3.8 3.5 - 5.5 mmol/L Final   10/03/2018 3.6 3.5 - 5.5 mmol/L Final      Chloride Chloride   Date Value Ref Range Status   10/04/2018 110 (H) 99 - 109 mmol/L Final   10/03/2018 110 (H) 99 - 109 mmol/L Final      CO2 CO2   Date Value Ref Range Status   10/04/2018 20.0 20.0 - 31.0 mmol/L Final   10/03/2018 18.0 (L) 20.0 - 31.0 mmol/L Final      BUN BUN   Date Value Ref Range Status   10/04/2018 54 (H) 9 - 23 mg/dL Final   10/03/2018 59 (H) 9 - 23 mg/dL Final      Creatinine Creatinine   Date Value Ref Range Status   10/04/2018 3.38 (H) 0.60 - 1.30 mg/dL Final   10/03/2018 3.70 (H) 0.60 - 1.30 mg/dL Final      Calcium Calcium   Date Value Ref Range Status   10/04/2018 8.5 (L) 8.7 - 10.4 mg/dL Final   10/03/2018 9.1 8.7 - 10.4 mg/dL Final      PO4 No results found for: CAPO4   Albumin Albumin   Date Value Ref Range Status   10/03/2018 3.60 3.20 - 4.80 g/dL Final      Magnesium Magnesium   Date Value Ref Range Status   10/04/2018 1.8 1.3 - 2.7 mg/dL Final      Uric Acid No results found for: URICACID        Results Review:     I reviewed the patient's new clinical results.      amLODIPine 10 mg Oral Daily   aspirin 81 mg Oral Daily   aztreonam 1 g Intravenous Q12H   DAPTOmycin 6 mg/kg Intravenous Q48H   docusate sodium 100 mg Oral Daily   famotidine 20 mg Oral Daily   heparin (porcine) 5,000 Units Subcutaneous Q8H   hydrochlorothiazide 25 mg Oral Daily   hydroxychloroquine 200 mg Oral BID   insulin lispro 0-7 Units Subcutaneous 4x Daily With Meals & Nightly   povidone-iodine  Topical  Q12H   terazosin 5 mg Oral Nightly   vitamin D3 5,000 Units Oral Daily          Medication Review:     Assessment/Plan     Principal Problem:    Peritonitis due to infected peritoneal dialysis catheter (CMS/HCC)  Active Problems:    Infection    Diabetic nephropathies (CMS/HCC)    HTN (hypertension)    Stage 4 chronic kidney disease (CMS/HCC)    Type 2 diabetes mellitus (CMS/HCC)    Anemia of chronic disease    Morbid obesity (CMS/HCC)    Venous stasis    1- CKD stage V - no signs and symptoms of uremia. Volume status - stable.   2- Peritonitis due to PD catheter.   3- HTN   4- Anemia of chronic disease.      Plan:  - Continue with antibx, follow culture.   - Monitor I/o   - Oral iron supplement.   - Avoid nephrotoxic agents.   - renal diet  - Adjust meds per renal fucntion   - no emergent need of dialysis. Will closely follow for such needs.      I discussed the patients findings and my recommendations with patient and nursing staff    Sandra Vance MD  10/06/18  10:03 AM

## 2018-10-06 NOTE — PLAN OF CARE
Problem: Patient Care Overview  Goal: Plan of Care Review  Outcome: Ongoing (interventions implemented as appropriate)   10/06/18 0132   Coping/Psychosocial   Plan of Care Reviewed With patient   Plan of Care Review   Progress no change     Goal: Individualization and Mutuality  Outcome: Ongoing (interventions implemented as appropriate)    Goal: Discharge Needs Assessment  Outcome: Ongoing (interventions implemented as appropriate)    Goal: Interprofessional Rounds/Family Conf  Outcome: Ongoing (interventions implemented as appropriate)

## 2018-10-06 NOTE — PLAN OF CARE
Problem: Patient Care Overview  Goal: Plan of Care Review  Outcome: Ongoing (interventions implemented as appropriate)   10/06/18 1441   Coping/Psychosocial   Plan of Care Reviewed With patient   Plan of Care Review   Progress improving       Problem: Fall Risk (Adult)  Goal: Identify Related Risk Factors and Signs and Symptoms  Outcome: Ongoing (interventions implemented as appropriate)   10/06/18 1441   Fall Risk (Adult)   Related Risk Factors (Fall Risk) age-related changes;fatigue/slow reaction   Signs and Symptoms (Fall Risk) presence of risk factors     Goal: Absence of Fall  Outcome: Ongoing (interventions implemented as appropriate)   10/06/18 1441   Fall Risk (Adult)   Absence of Fall making progress toward outcome       Problem: Infection, Risk/Actual (Adult)  Goal: Identify Related Risk Factors and Signs and Symptoms  Outcome: Ongoing (interventions implemented as appropriate)   10/06/18 1441   Infection, Risk/Actual (Adult)   Related Risk Factors (Infection, Risk/Actual) age extremes;surgery/procedure     Goal: Infection Prevention/Resolution  Outcome: Ongoing (interventions implemented as appropriate)   10/06/18 1441   Infection, Risk/Actual (Adult)   Infection Prevention/Resolution making progress toward outcome

## 2018-10-07 LAB
GLUCOSE BLDC GLUCOMTR-MCNC: 127 MG/DL (ref 70–130)
GLUCOSE BLDC GLUCOMTR-MCNC: 159 MG/DL (ref 70–130)
GLUCOSE BLDC GLUCOMTR-MCNC: 167 MG/DL (ref 70–130)
GLUCOSE BLDC GLUCOMTR-MCNC: 82 MG/DL (ref 70–130)

## 2018-10-07 PROCEDURE — 82962 GLUCOSE BLOOD TEST: CPT

## 2018-10-07 PROCEDURE — 25010000002 HEPARIN (PORCINE) PER 1000 UNITS: Performed by: SURGERY

## 2018-10-07 PROCEDURE — G0378 HOSPITAL OBSERVATION PER HR: HCPCS

## 2018-10-07 RX ADMIN — HEPARIN SODIUM 5000 UNITS: 5000 INJECTION, SOLUTION INTRAVENOUS; SUBCUTANEOUS at 21:08

## 2018-10-07 RX ADMIN — TERAZOSIN HYDROCHLORIDE 5 MG: 5 CAPSULE ORAL at 21:08

## 2018-10-07 RX ADMIN — AMLODIPINE BESYLATE 10 MG: 10 TABLET ORAL at 08:21

## 2018-10-07 RX ADMIN — INSULIN LISPRO 2 UNITS: 100 INJECTION, SOLUTION INTRAVENOUS; SUBCUTANEOUS at 21:09

## 2018-10-07 RX ADMIN — INSULIN LISPRO 2 UNITS: 100 INJECTION, SOLUTION INTRAVENOUS; SUBCUTANEOUS at 11:53

## 2018-10-07 RX ADMIN — ASPIRIN 81 MG: 81 TABLET, COATED ORAL at 08:22

## 2018-10-07 RX ADMIN — AZTREONAM 1 G: 1 INJECTION, POWDER, LYOPHILIZED, FOR SOLUTION INTRAMUSCULAR; INTRAVENOUS at 13:42

## 2018-10-07 RX ADMIN — POVIDONE-IODINE 1 APPLICATION: 100 OINTMENT TOPICAL at 08:23

## 2018-10-07 RX ADMIN — HEPARIN SODIUM 5000 UNITS: 5000 INJECTION, SOLUTION INTRAVENOUS; SUBCUTANEOUS at 05:15

## 2018-10-07 RX ADMIN — HYDROCHLOROTHIAZIDE 25 MG: 25 TABLET ORAL at 08:21

## 2018-10-07 RX ADMIN — HEPARIN SODIUM 5000 UNITS: 5000 INJECTION, SOLUTION INTRAVENOUS; SUBCUTANEOUS at 15:04

## 2018-10-07 RX ADMIN — HYDROXYCHLOROQUINE SULFATE 200 MG: 200 TABLET, FILM COATED ORAL at 08:21

## 2018-10-07 RX ADMIN — POVIDONE-IODINE: 100 OINTMENT TOPICAL at 21:08

## 2018-10-07 RX ADMIN — HYDROXYCHLOROQUINE SULFATE 200 MG: 200 TABLET, FILM COATED ORAL at 21:09

## 2018-10-07 RX ADMIN — FAMOTIDINE 20 MG: 20 TABLET ORAL at 08:22

## 2018-10-07 RX ADMIN — Medication 5000 UNITS: at 08:21

## 2018-10-07 RX ADMIN — AZTREONAM 1 G: 1 INJECTION, POWDER, LYOPHILIZED, FOR SOLUTION INTRAMUSCULAR; INTRAVENOUS at 11:53

## 2018-10-07 RX ADMIN — AZTREONAM 1 G: 1 INJECTION, POWDER, LYOPHILIZED, FOR SOLUTION INTRAMUSCULAR; INTRAVENOUS at 00:22

## 2018-10-07 NOTE — PLAN OF CARE
Problem: Patient Care Overview  Goal: Plan of Care Review  Outcome: Ongoing (interventions implemented as appropriate)    Goal: Individualization and Mutuality  Outcome: Ongoing (interventions implemented as appropriate)    Goal: Discharge Needs Assessment  Outcome: Ongoing (interventions implemented as appropriate)    Goal: Interprofessional Rounds/Family Conf  Outcome: Ongoing (interventions implemented as appropriate)      Problem: Fall Risk (Adult)  Goal: Identify Related Risk Factors and Signs and Symptoms  Outcome: Ongoing (interventions implemented as appropriate)    Goal: Absence of Fall  Outcome: Ongoing (interventions implemented as appropriate)      Problem: Infection, Risk/Actual (Adult)  Goal: Identify Related Risk Factors and Signs and Symptoms  Outcome: Ongoing (interventions implemented as appropriate)    Goal: Infection Prevention/Resolution  Outcome: Ongoing (interventions implemented as appropriate)

## 2018-10-07 NOTE — PROGRESS NOTES
"   LOS: 0 days    Patient Care Team:  Provider, No Known as PCP - General    Chief Complaint:  PD catheter dysfunction.    Subjective   No acute events overnight. No new complaints.   Review of Systems:   No CP or SOA    Objective     Vital Sign Min/Max for last 24 hours  Temp  Min: 97.6 °F (36.4 °C)  Max: 98.1 °F (36.7 °C)   BP  Min: 179/64  Max: 184/63   Pulse  Min: 65  Max: 67   Resp  Min: 18  Max: 18   SpO2  Min: 99 %  Max: 99 %   No Data Recorded   No Data Recorded     Flowsheet Rows      First Filed Value   Admission Height  160 cm (63\") Documented at 10/03/2018 1058   Admission Weight  91.6 kg (202 lb) Documented at 10/03/2018 1058          I/O this shift:  In: -   Out: 400 [Urine:400]  I/O last 3 completed shifts:  In: 700 [P.O.:600; IV Piggyback:100]  Out: 2250 [Urine:2250]    Physical Exam:     General Appearance:    Alert, cooperative, in no acute distress   Head:    Normocephalic, without obvious abnormality, atraumatic               Neck:   No adenopathy, supple, trachea midline, no thyromegaly, no     carotid bruit, no JVD       Lungs:     Clear to auscultation,respirations regular, even and                   unlabored    Heart:    Regular rhythm and normal rate, normal S1 and S2, no            murmur, no gallop, no rub, no click       Abdomen:     Normal bowel sounds, no masses, no organomegaly, soft        non-tender, non-distended, no guarding, no rebound                 tenderness       Extremities:   Moves all extremities well, no edema, no cyanosis, no              redness               Neurologic:   Cranial nerves 2 - 12 grossly intact, sensation intact, DTR        present and equal bilaterally       WBC No results found for: WBC   HGB No results found for: HGB   HCT No results found for: HCT   Platlets No results found for: LABPLAT   MCV No results found for: MCV       Sodium No results found for: NA   Potassium No results found for: K   Chloride No results found for: CL   CO2 No results found " for: CO2   BUN No results found for: BUN   Creatinine No results found for: CREATININE   Calcium No results found for: CALCIUM   PO4 No results found for: CAPO4   Albumin No results found for: ALBUMIN   Magnesium No results found for: MG   Uric Acid No results found for: URICACID        Results Review:     I reviewed the patient's new clinical results.      amLODIPine 10 mg Oral Daily   aspirin 81 mg Oral Daily   aztreonam 1 g Intravenous Q12H   DAPTOmycin 6 mg/kg Intravenous Q48H   docusate sodium 100 mg Oral Daily   famotidine 20 mg Oral Daily   heparin (porcine) 5,000 Units Subcutaneous Q8H   hydrochlorothiazide 25 mg Oral Daily   hydroxychloroquine 200 mg Oral BID   insulin lispro 0-7 Units Subcutaneous 4x Daily With Meals & Nightly   povidone-iodine  Topical Q12H   terazosin 5 mg Oral Nightly   vitamin D3 5,000 Units Oral Daily          Medication Review:     Assessment/Plan     Principal Problem:    Peritonitis due to infected peritoneal dialysis catheter (CMS/HCC)  Active Problems:    Infection    Diabetic nephropathies (CMS/HCC)    HTN (hypertension)    Stage 4 chronic kidney disease (CMS/HCC)    Type 2 diabetes mellitus (CMS/[facility])    Anemia of chronic disease    Morbid obesity (CMS/HCC)    Venous stasis    1- CKD stage V - no signs and symptoms of uremia. Volume status - stable.   2- Peritonitis due to PD catheter.   3- HTN   4- Anemia of chronic disease.      Plan:  - Continue with antibx, follow culture.   - Monitor I/o   - Oral iron supplement.   - Avoid nephrotoxic agents.   - renal diet  - Adjust meds per renal fucntion   - no emergent need of dialysis. Will closely follow for such needs.      I discussed the patients findings and my recommendations with patient and nursing staff    Sandra Vance MD  10/07/18  9:47 AM

## 2018-10-07 NOTE — PROGRESS NOTES
"General Surgery Post op Follow Up Note    Subjective:       /64   Pulse 67   Temp 98.1 °F (36.7 °C) (Oral)   Resp 18   Ht 160 cm (63\")   Wt 91.6 kg (202 lb)   SpO2 99%   Breastfeeding? No   BMI 35.78 kg/m²     General Appearance:  in no acute distress  Abdomen: incision is clean, mild erythema    CBC    Results from last 7 days  Lab Units 10/04/18  0736   WBC 10*3/mm3 8.00   HEMOGLOBIN g/dL 8.7*   HEMATOCRIT % 26.9*   PLATELETS 10*3/mm3 257       CMP/BMP    Results from last 7 days  Lab Units 10/04/18  0736 10/03/18  1047   SODIUM mmol/L 138 139   POTASSIUM mmol/L 3.8 3.6   CHLORIDE mmol/L 110* 110*   CO2 mmol/L 20.0 18.0*   BUN mg/dL 54* 59*   CREATININE mg/dL 3.38* 3.70*   CALCIUM mg/dL 8.5* 9.1   BILIRUBIN mg/dL  --  0.5   ALK PHOS U/L  --  41   ALT (SGPT) U/L  --  30   AST (SGOT) U/L  --  44*   GLUCOSE mg/dL 100 74         ASSESSMENT/PLAN:    Infected PD catheter.  Tolerating PO.  Antibiotics per ID.    Continue local wound care with Betadine daily.  Minimize tape on the skin, use an abdominal binder.  Will see PRN.     Gonzalez Sauceda MD  10/7/2018  12:26 PM  "

## 2018-10-08 VITALS
HEIGHT: 63 IN | SYSTOLIC BLOOD PRESSURE: 156 MMHG | BODY MASS INDEX: 35.79 KG/M2 | HEART RATE: 64 BPM | TEMPERATURE: 98 F | WEIGHT: 202 LBS | RESPIRATION RATE: 18 BRPM | DIASTOLIC BLOOD PRESSURE: 73 MMHG | OXYGEN SATURATION: 98 %

## 2018-10-08 LAB
ALBUMIN SERPL-MCNC: 2.88 G/DL (ref 3.2–4.8)
ALBUMIN/GLOB SERPL: 1.1 G/DL (ref 1.5–2.5)
ALP SERPL-CCNC: 48 U/L (ref 25–100)
ALT SERPL W P-5'-P-CCNC: 58 U/L (ref 7–40)
ANION GAP SERPL CALCULATED.3IONS-SCNC: 9 MMOL/L (ref 3–11)
AST SERPL-CCNC: 139 U/L (ref 0–33)
BASOPHILS # BLD AUTO: 0.03 10*3/MM3 (ref 0–0.2)
BASOPHILS NFR BLD AUTO: 0.5 % (ref 0–1)
BILIRUB SERPL-MCNC: 0.2 MG/DL (ref 0.3–1.2)
BUN BLD-MCNC: 68 MG/DL (ref 9–23)
BUN/CREAT SERPL: 19.2 (ref 7–25)
CALCIUM SPEC-SCNC: 8.5 MG/DL (ref 8.7–10.4)
CHLORIDE SERPL-SCNC: 109 MMOL/L (ref 99–109)
CK SERPL-CCNC: 2630 U/L (ref 26–174)
CO2 SERPL-SCNC: 20 MMOL/L (ref 20–31)
CREAT BLD-MCNC: 3.55 MG/DL (ref 0.6–1.3)
DEPRECATED RDW RBC AUTO: 44 FL (ref 37–54)
EOSINOPHIL # BLD AUTO: 0 10*3/MM3 (ref 0–0.3)
EOSINOPHIL NFR BLD AUTO: 0 % (ref 0–3)
ERYTHROCYTE [DISTWIDTH] IN BLOOD BY AUTOMATED COUNT: 12.7 % (ref 11.3–14.5)
GFR SERPL CREATININE-BSD FRML MDRD: 12 ML/MIN/1.73
GLOBULIN UR ELPH-MCNC: 2.7 GM/DL
GLUCOSE BLD-MCNC: 133 MG/DL (ref 70–100)
GLUCOSE BLDC GLUCOMTR-MCNC: 129 MG/DL (ref 70–130)
GLUCOSE BLDC GLUCOMTR-MCNC: 145 MG/DL (ref 70–130)
GLUCOSE BLDC GLUCOMTR-MCNC: 84 MG/DL (ref 70–130)
HCT VFR BLD AUTO: 29.7 % (ref 34.5–44)
HGB BLD-MCNC: 9.6 G/DL (ref 11.5–15.5)
IMM GRANULOCYTES # BLD: 0.06 10*3/MM3 (ref 0–0.03)
IMM GRANULOCYTES NFR BLD: 0.9 % (ref 0–0.6)
LYMPHOCYTES # BLD AUTO: 1.05 10*3/MM3 (ref 0.6–4.8)
LYMPHOCYTES NFR BLD AUTO: 16.1 % (ref 24–44)
MCH RBC QN AUTO: 30.4 PG (ref 27–31)
MCHC RBC AUTO-ENTMCNC: 32.3 G/DL (ref 32–36)
MCV RBC AUTO: 94 FL (ref 80–99)
MONOCYTES # BLD AUTO: 0.51 10*3/MM3 (ref 0–1)
MONOCYTES NFR BLD AUTO: 7.8 % (ref 0–12)
NEUTROPHILS # BLD AUTO: 4.89 10*3/MM3 (ref 1.5–8.3)
NEUTROPHILS NFR BLD AUTO: 74.7 % (ref 41–71)
PLATELET # BLD AUTO: 272 10*3/MM3 (ref 150–450)
PMV BLD AUTO: 10.4 FL (ref 6–12)
POTASSIUM BLD-SCNC: 4 MMOL/L (ref 3.5–5.5)
PROT SERPL-MCNC: 5.6 G/DL (ref 5.7–8.2)
RBC # BLD AUTO: 3.16 10*6/MM3 (ref 3.89–5.14)
SODIUM BLD-SCNC: 138 MMOL/L (ref 132–146)
WBC NRBC COR # BLD: 6.54 10*3/MM3 (ref 3.5–10.8)

## 2018-10-08 PROCEDURE — 25010000002 DAPTOMYCIN PER 1 MG: Performed by: INTERNAL MEDICINE

## 2018-10-08 PROCEDURE — 99233 SBSQ HOSP IP/OBS HIGH 50: CPT | Performed by: INTERNAL MEDICINE

## 2018-10-08 PROCEDURE — 85025 COMPLETE CBC W/AUTO DIFF WBC: CPT | Performed by: INTERNAL MEDICINE

## 2018-10-08 PROCEDURE — 82962 GLUCOSE BLOOD TEST: CPT

## 2018-10-08 PROCEDURE — 80053 COMPREHEN METABOLIC PANEL: CPT | Performed by: INTERNAL MEDICINE

## 2018-10-08 PROCEDURE — 25010000002 NA FERRIC GLUC CPLX PER 12.5 MG: Performed by: INTERNAL MEDICINE

## 2018-10-08 PROCEDURE — 97530 THERAPEUTIC ACTIVITIES: CPT

## 2018-10-08 PROCEDURE — 25010000002 HEPARIN (PORCINE) PER 1000 UNITS: Performed by: SURGERY

## 2018-10-08 PROCEDURE — 82550 ASSAY OF CK (CPK): CPT | Performed by: INTERNAL MEDICINE

## 2018-10-08 RX ORDER — LINEZOLID 600 MG/1
600 TABLET, FILM COATED ORAL 2 TIMES DAILY
Qty: 28 TABLET | Refills: 0 | Status: SHIPPED | OUTPATIENT
Start: 2018-10-10

## 2018-10-08 RX ORDER — FERROUS SULFATE 325(65) MG
325 TABLET ORAL
Status: DISCONTINUED | OUTPATIENT
Start: 2018-10-08 | End: 2018-10-08 | Stop reason: HOSPADM

## 2018-10-08 RX ADMIN — SODIUM CHLORIDE 250 MG: 9 INJECTION, SOLUTION INTRAVENOUS at 13:21

## 2018-10-08 RX ADMIN — Medication 5000 UNITS: at 09:12

## 2018-10-08 RX ADMIN — DOCUSATE SODIUM 100 MG: 100 CAPSULE, LIQUID FILLED ORAL at 09:13

## 2018-10-08 RX ADMIN — POVIDONE-IODINE: 100 OINTMENT TOPICAL at 09:14

## 2018-10-08 RX ADMIN — HEPARIN SODIUM 5000 UNITS: 5000 INJECTION, SOLUTION INTRAVENOUS; SUBCUTANEOUS at 13:20

## 2018-10-08 RX ADMIN — FAMOTIDINE 20 MG: 20 TABLET ORAL at 09:13

## 2018-10-08 RX ADMIN — AMLODIPINE BESYLATE 10 MG: 10 TABLET ORAL at 09:13

## 2018-10-08 RX ADMIN — HEPARIN SODIUM 5000 UNITS: 5000 INJECTION, SOLUTION INTRAVENOUS; SUBCUTANEOUS at 05:33

## 2018-10-08 RX ADMIN — AZTREONAM 1 G: 1 INJECTION, POWDER, LYOPHILIZED, FOR SOLUTION INTRAMUSCULAR; INTRAVENOUS at 01:03

## 2018-10-08 RX ADMIN — ASPIRIN 81 MG: 81 TABLET, COATED ORAL at 09:13

## 2018-10-08 RX ADMIN — HYDROXYCHLOROQUINE SULFATE 200 MG: 200 TABLET, FILM COATED ORAL at 09:13

## 2018-10-08 RX ADMIN — HYDROCHLOROTHIAZIDE 25 MG: 25 TABLET ORAL at 09:13

## 2018-10-08 RX ADMIN — DAPTOMYCIN 550 MG: 500 INJECTION, POWDER, LYOPHILIZED, FOR SOLUTION INTRAVENOUS at 12:15

## 2018-10-08 NOTE — PROGRESS NOTES
Infectious Disease Progress Note  Cleo Hobbs  1940  [id number]    Admission Date: 10/3/2018    Requesting Provider: Gonzalez Sauceda, *  Evaluating Physician: Sameer Adame MD    Chief Complaint: abdominal pain    Reason for Consultation: peritonitis due to infected PD catheter    History of present illness:   Patient is a 78 y.o.  Yr old female with history of CKD4, DM2 and Sjogrens.  On August 13 she had placement of a peritoneal dialysis catheter by Dr. Sauceda, in preparation for likely dialysis.  The catheter was in place for 4 weeks before it was flushed for the first time.  It worked the first week it was, but the second week no fluid was returned after it was infused.  She subsequently had several manipulations to try to fix the catheter ultimately nothing worked.  There was some concern for catheter infection due to abdominal pain and some local erythema around the insertion site.  There is also some white mucoid drainage from around the base of the catheter.  She is brought to the OR on 10/13 for planned repositioning of the catheter but intraoperatively she was noted to have purulent material both in the catheter and within the abdominal cavity itself.  Thus the catheter was removed and cultures were taken.  She has been started on IV clindamycin due to her antibiotic allergies.  She has not noticed any fevers throughout this whole episode that may have had some chills.  She was not been on any antibiotics for the past 6 weeks prior to admission.  Right before she had a peritoneal dialysis catheter placed she was diagnosed with a UTI and treated with an unknown antibiotic.  She complains of right-sided abdominal pain.  She has chronic, unchanged back pain.  Denies having any diarrhea.  She is urinating normally.    10/4: Afebrile since admission.  Hemodynamic stable.  She is on clindamycin which is well tolerated thus far.  Her abdominal pain is improved but not resolved.  PD catheter  "removed in OR 10/3 and insertion site is currently dressed.    10/5: started on daptomycin and levofloxacin. Although I asked her about prior antibiotic allergy she failed to mention prior reaction to levofloxacin. She broke in a rash soon after it was started. Given benadryl and infusion stopped. Now rash and pruritis have resolved. Otherwise stable overnight. Afebrile. Less abdominal pain, but still some discomfort at site where catheter was removed.     10/8: stable over the weekend. No labs check until this morning. CK up to 2630, but no myalgias, or muscle aches. Afebrile. Abdomen is sore but not acutely tender. No drainage on bandage.    Review of Systems  ROS:  No fevers or chills. No n/v/d. Leg swelling stable. 12 point ROS, otherwise negative except for HPI    Allergies   Allergen Reactions   • Penicillins Anaphylaxis   • Daptomycin Myalgia     Elevated CK 10/8/18   • Levaquin [Levofloxacin] Itching   • Sulfa Antibiotics Unknown (See Comments)     PT STATES IT \"MAKES HER SICK\" hurts her back   Penicillin caused anaphylaxis as a young adult.  She is also had a rash with Keflex.  She took Bactrim for a UTI and she thinks it caused her to have back pain but no other allergic symptoms  Rash with levofloxacin.     Antibiotics:  Anti-Infectives     Ordered     Dose/Rate Route Frequency Start Stop    10/04/18 1501  aztreonam (AZACTAM) 1 g in sodium chloride 0.9 % 100 mL IVPB-MBP     Ordering Provider:  Sameer Adame MD    1 g  200 mL/hr over 30 Minutes Intravenous Once 10/04/18 1545 10/04/18 1700    10/04/18 1141  levoFLOXacin (LEVAQUIN) 750 mg/150 mL D5W (premix) (LEVAQUIN) 750 mg     Ordering Provider:  Sameer Adame MD    750 mg Intravenous Once 10/04/18 1300 10/04/18 1447    10/03/18 1711  clindamycin (CLEOCIN) 600 mg in dextrose 5% 50 mL IVPB (premix)     Ordering Provider:  Muriel Soto MD    600 mg Intravenous Every 8 Hours 10/03/18 2153 10/04/18 0745    10/03/18 1553  " hydroxychloroquine (PLAQUENIL) tablet 200 mg     Ordering Provider:  Gonzalez Sauceda MD    200 mg Oral 2 Times Daily 10/03/18 2100            Other Medications:  Current Facility-Administered Medications   Medication Dose Route Frequency Provider Last Rate Last Dose   • acetaminophen (TYLENOL) tablet 650 mg  650 mg Oral Q4H PRN Gonzalez Sauceda MD        Or   • acetaminophen (TYLENOL) 160 MG/5ML solution 650 mg  650 mg Oral Q4H PRN Gonzalez Sauceda MD        Or   • acetaminophen (TYLENOL) suppository 650 mg  650 mg Rectal Q4H PRN Gonzalez Sauceda MD       • amLODIPine (NORVASC) tablet 10 mg  10 mg Oral Daily Gonzalez Sauceda MD   10 mg at 10/08/18 0913   • aspirin EC tablet 81 mg  81 mg Oral Daily Gonzalez Sauceda MD   81 mg at 10/08/18 0913   • bisacodyl (DULCOLAX) EC tablet 10 mg  10 mg Oral Daily PRN Herlinda Buckley APRN       • dextrose (D50W) 25 g/ 50mL Intravenous Solution 25 g  25 g Intravenous Q15 Min PRN Herlinda Buckley APRN       • dextrose (GLUTOSE) oral gel 15 g  15 g Oral Q15 Min PRN Herlinda Buckley APRN       • docusate sodium (COLACE) capsule 100 mg  100 mg Oral BID PRN Gonzalez Sauceda MD       • docusate sodium (COLACE) capsule 100 mg  100 mg Oral Daily Herlinda Buckley APRN   100 mg at 10/08/18 0913   • famotidine (PEPCID) tablet 20 mg  20 mg Oral Daily Muriel Soto MD   20 mg at 10/08/18 0913   • ferrous sulfate tablet 325 mg  325 mg Oral Daily With Breakfast Sandra Vance MD       • glucagon (human recombinant) (GLUCAGEN DIAGNOSTIC) injection 1 mg  1 mg Subcutaneous PRN Herlinda Buckley APRN       • heparin (porcine) 5000 UNIT/ML injection 5,000 Units  5,000 Units Subcutaneous Q8H Gonzalez Sauceda MD   5,000 Units at 10/08/18 1320   • hydrochlorothiazide (HYDRODIURIL) tablet 25 mg  25 mg Oral Daily Gonzalez Sauceda MD   25 mg at 10/08/18 0913   • HYDROcodone-acetaminophen (NORCO) 5-325  "MG per tablet 1 tablet  1 tablet Oral Q4H PRN Gonzalez Sauceda MD       • hydroxychloroquine (PLAQUENIL) tablet 200 mg  200 mg Oral BID Gonzalez Sauceda MD   200 mg at 10/08/18 0913   • insulin lispro (humaLOG) injection 0-7 Units  0-7 Units Subcutaneous 4x Daily With Meals & Nightly Herlinda Buckley, APRN   2 Units at 10/07/18 2109   • magnesium hydroxide (MILK OF MAGNESIA) suspension 2400 mg/10mL 10 mL  10 mL Oral Daily PRN Gonzalez Sauceda MD       • Morphine sulfate (PF) injection 2 mg  2 mg Intravenous Q2H PRN Gonzalez Sauceda MD        And   • naloxone (NARCAN) injection 0.4 mg  0.4 mg Intravenous Q5 Min PRN Gonzalez Sauceda MD       • ondansetron (ZOFRAN) tablet 4 mg  4 mg Oral Q6H PRN Gonzalez Sauceda MD        Or   • ondansetron (ZOFRAN) injection 4 mg  4 mg Intravenous Q6H PRN Gonzalez Sauceda MD       • povidone-iodine (BETADINE) ointment   Topical Q12H Gonzalez Sauceda MD       • sennosides-docusate sodium (SENOKOT-S) 8.6-50 MG tablet 2 tablet  2 tablet Oral Nightly PRN Herlinda Buckley, APRN   2 tablet at 10/05/18 0818   • terazosin (HYTRIN) capsule 5 mg  5 mg Oral Nightly Gonzalez Sauceda MD   5 mg at 10/07/18 2108   • vitamin D3 capsule 5,000 Units  5,000 Units Oral Daily Gonzalez Sauceda MD   5,000 Units at 10/08/18 0912       Physical Exam:   Vital Signs   /73 (BP Location: Right arm, Patient Position: Sitting)   Pulse 65   Temp 98 °F (36.7 °C) (Oral)   Resp 18   Ht 160 cm (63\")   Wt 91.6 kg (202 lb)   SpO2 98%   Breastfeeding? No   BMI 35.78 kg/m²     GENERAL: Awake and alert, in no acute distress.   HEENT: Normocephalic, atraumatic.  PERRL. EOMI. No conjunctival injection. No icterus.  Dry mucous membranes  HEART: RRR; No murmur.  LUNGS: Clear to auscultation bilaterally without wheezing, rales, rhonchi. Normal respiratory effort. Nonlabored.  ABDOMEN: Obese, Soft,, nondistended. No rebound or guarding. " Former PD catheter site in right upper quadrant is dressed, clean. Slight tenderness near dressing  EXT: Bilateral lower extremity venous stasis changes.  No edema  :  Without Elizabeth catheter.  MSK: bilateral lower extremity edema with chronic venous stasis changed on legs.  SKIN: Warm and dry without cutaneous eruptions on Inspection/palpation.    NEURO: Oriented to PPT. No focal deficits on motor/sensory exam at arms/legs.  PSYCHIATRIC: Normal insight and judgement. Cooperative with PE    Laboratory Data      Results from last 7 days  Lab Units 10/08/18  0934 10/04/18  0736 10/03/18  1047   WBC 10*3/mm3 6.54 8.00 9.31   HEMOGLOBIN g/dL 9.6* 8.7* 8.8*   HEMATOCRIT % 29.7* 26.9* 27.3*   PLATELETS 10*3/mm3 272 257 283       Results from last 7 days  Lab Units 10/08/18  0934   SODIUM mmol/L 138   POTASSIUM mmol/L 4.0   CHLORIDE mmol/L 109   CO2 mmol/L 20.0   BUN mg/dL 68*   CREATININE mg/dL 3.55*   GLUCOSE mg/dL 133*   CALCIUM mg/dL 8.5*     Estimated Creatinine Clearance: 13.6 mL/min (A) (by C-G formula based on SCr of 3.55 mg/dL (H)).    Results from last 7 days  Lab Units 10/08/18  0934   ALK PHOS U/L 48   BILIRUBIN mg/dL 0.2*   ALT (SGPT) U/L 58*   AST (SGOT) U/L 139*               CK 86 to 2630    Microbiology:  Patient says culture from PD catheter tip was done by her local surgeon about 3 weeks prior to admission that was negative     10/3 OR cultures pending: gram stain with GPB, culture growing Corynebacterium. Requested isolate be sent to [facility] for MICs on 10/5  Anaerobe, fungal and AFB cultures pending    No blood cultures       Radiology:  No radiology results for the last 7 days       Impression:   1. Generalized peritonitis related to infected PD catheter, due to Corynebacterium-catheter removed in OR on 10/3, gross purulence noted both from catheter and abdominal cavity. Cultures only growing Corynebacterium so far, but anaerobes, fungal AFB pending.  This will likely be a low purulence organism given the  chronicity of her presentation, so Corynebacterium alone makes sense. Isolate sent to  for MICs on 10/7  2. CKD stage 4 - nearing need for dialysis but has not been started on it yet.  3. Bilateral lower extremity venous stasis  - improved from baseline per patient  4. Myositis due to daptomycin, stopped, received last dose today. Renal function stable. asymptomatic  5. Type 2 diabetes   6. Fibromyalgia - on Plaquenil  7. Sjogren syndrome  8. Anemia of chronic disease  9. History of anaphylaxis with penicillin  10. Possible sulfa allergy  11. Levofloxacin allergy - added to list    PLAN: Thank you for asking us to see Cleo Hobbs, I recommend the following:  - repeat labs ordered for today  - CK elevated so stopped daptomycin. Added to allergy list.   - stopped aztreonam since no gram negatives  - Follow-up pending cultures from OR on 10/3. Including AFB, Fungal and anaerobe  - I called lab today. Isolate sent to [facility], likely wont have MICs until later this week.     - discussed antibiotic options with patient at length with patient again today.  Antibiotic options are quite limited by allergies and renal function. daptomycin no longer an option. There is some data about successful use of linezolid for corynebacterium, although no way to no for sure until we have MICs. Since vancomycin is still too high risk this really is her best option going forward.     - start oral linezolid 600 mg BID. Since she got daptomycin today the first dose can be on 10/10. Plan for 14 day course. Discussed with RN who is calling to see if prior authorization is needed.   - repeat labs on 10/11: CBC, BMP, CK. Faxed to [phone] attn: Dr Adame  - follow up with me next week, either 10/18 or 10/19. Called office to schedule appointment  - plan for 3 week course of antibiotics from date of catheter removal on 10/3. Stop date 10/23    I spent greater than 35 minutes caring for Cleo Hobbs today with >50% of the time spent  counseling and coordinating care. Discussed with patient, RN and Dr Elvira Adame MD  10/8/2018

## 2018-10-08 NOTE — THERAPY TREATMENT NOTE
Acute Care - Physical Therapy Treatment Note  [facility]     Patient Name: Cleo Hobbs  : 1940  MRN: [id number]  Today's Date: 10/8/2018  Onset of Illness/Injury or Date of Surgery: 10/03/18  Date of Referral to PT: 10/03/18  Referring Physician: CHEO Soto MD    Admit Date: 10/3/2018    Visit Dx:    ICD-10-CM ICD-9-CM   1. Impaired functional mobility, balance, gait, and endurance Z74.09 V49.89   2. Infection B99.9 136.9     Patient Active Problem List   Diagnosis   • Infection   • Diabetic nephropathies (CMS/HCC)   • HTN (hypertension)   • Stage 4 chronic kidney disease (CMS/HCC)   • Peritonitis due to infected peritoneal dialysis catheter (CMS/HCC)   • Type 2 diabetes mellitus (CMS/HCC)   • Anemia of chronic disease   • Morbid obesity (CMS/HCC)   • Venous stasis       Therapy Treatment          Rehabilitation Treatment Summary     Row Name 10/08/18 1300 10/08/18 1103          Treatment Time/Intention    Discipline --  - physical therapist  -     Document Type --  -EH therapy note (daily note)  -     Subjective Information --  - complains of;weakness;pain  -     Mode of Treatment --  - individual therapy;physical therapy  -     Therapy Frequency (PT Clinical Impression) --  - daily  -     Patient Effort --  - good  -EH     Recorded by [EH] Chloe Welsl, PT 10/08/18 1330 [EH] Chloe Wells, PT 10/08/18 1330     Row Name 10/08/18 1300 10/08/18 1103          Cognitive Assessment/Intervention- PT/OT    Orientation Status (Cognition) --  - oriented x 4  -EH     Follows Commands (Cognition) --  - WFL  -EH     Recorded by [EH] Chloe Wells, PT 10/08/18 1330 [EH] Chloe Wells, PT 10/08/18 1330     Row Name 10/08/18 1300 10/08/18 1103          Safety Issues, Functional Mobility    Safety Issues Affecting Function (Mobility) --  - judgment  -EH     Impairments Affecting Function (Mobility) --  - endurance/activity tolerance;pain;range of motion (ROM)   -EH     Recorded by [EH] Chloe Wells, PT 10/08/18 1330 [EH] Chloe Wells, PT 10/08/18 1330     Row Name 10/08/18 1300 10/08/18 1103          Bed Mobility Assessment/Treatment    Comment (Bed Mobility) --  -EH pt sitting on EOB when PT enters, and returns to this position.  -EH     Recorded by [EH] Chloe Wells, PT 10/08/18 1330 [EH] Chleo Wells, PT 10/08/18 1330     Row Name 10/08/18 1300 10/08/18 1103          Transfer Assessment/Treatment    Transfer Assessment/Treatment --  -EH sit-stand transfer;stand-sit transfer;toilet transfer  -EH     Recorded by [EH] Chloe Wells, PT 10/08/18 1330 [EH] Chloe Wells, PT 10/08/18 1330     Row Name 10/08/18 1300 10/08/18 1103          Sit-Stand Transfer    Sit-Stand [place] (Transfers) --  -EH contact guard  -EH     Recorded by [EH] Chloe Wells, PT 10/08/18 1330 [EH] Chloe Wells, PT 10/08/18 1330     Row Name 10/08/18 1300 10/08/18 1103          Stand-Sit Transfer    Stand-Sit [place] (Transfers) --  - contact guard  -EH     Recorded by [EH] Chloe Wells, PT 10/08/18 1330 [EH] Chloe Wells, PT 10/08/18 1330     Row Name 10/08/18 1300 10/08/18 1103          Gait/Stairs Assessment/Training    [place] Level (Gait) --  -EH stand by assist  -EH     Assistive Device (Gait) --  -EH other (see comments)   pt pushes w/c from home during ambulation  -     Comment (Gait/Stairs) --  - Pt ambulates while pushing w/c from home, states this is what she normally does when walking long distances. PT discusses AD options and how to wrap wash cloth around handles to make them more comfortable. Pt however continues to decline cane or walker trial.  -EH     Recorded by [] Chloe Wells, PT 10/08/18 1330 [EH] Chloe Wells, PT 10/08/18 1330     Row Name 10/08/18 1300 10/08/18 1103          Motor Skills Assessment/Interventions    Additional Documentation --  - Therapeutic Exercise (Group)   -EH     Recorded by [] Chloe Wells, PT 10/08/18 1330 [EH] Chloe Wells, PT 10/08/18 1330     Row Name 10/08/18 1300 10/08/18 1103          Therapeutic Exercise    79237 - PT Therapeutic Activity Minutes --  -EH 30  -EH     Recorded by [EH] Chloe Wells, PT 10/08/18 1330 [EH] Chloe Wells, PT 10/08/18 1330     Row Name 10/08/18 1300 10/08/18 1103          Balance    Balance sitting balance activity  -EH sitting balance activity  -EH     Recorded by [] Chloe Wells, PT 10/08/18 1330 [EH] Chloe Wells, PT 10/08/18 1330     Row Name 10/08/18 1300 10/08/18 1103          Static Standing Balance    Level of [place] (Supported Standing, Static Balance) --  - supervision  -     Time Able to Maintain Position (Supported Standing, Static Balance) --  - 4 to 5 minutes  -     Assistive Device Utilized (Supported Standing, Static Balance)  -- --   sitting rest break  -EH     Recorded by [] Chloe Wells, PT 10/08/18 1330 [EH] Chloe Wells, PT 10/08/18 1330     Row Name 10/08/18 1300 10/08/18 1103          Positioning and Restraints    Pre-Treatment Position --  -EH sitting in chair/recliner  -EH     Post Treatment Position --  - chair  -EH     In Chair --  - notified nsg;reclined;call light within reach;encouraged to call for assist;legs elevated;heels elevated  -EH     Recorded by [] Chloe Wells, PT 10/08/18 1330 [EH] Chloe Wells, PT 10/08/18 1330     Row Name 10/08/18 1300 10/08/18 1103          Pain Scale: Numbers Pre/Post-Treatment    Pain Scale: Numbers, Pretreatment --  - 3/10  -EH     Pain Scale: Numbers, Post-Treatment --  - 4/10  -EH     Pain Location - Orientation --  - other (see comments)  -     Pain Location --  - back  -EH     Recorded by [] Chloe Wells, PT 10/08/18 1330 [EH] Chloe Wells, PT 10/08/18 1330     Row Name                Wound 10/03/18 1520 abdomen incision    Wound - Properties  Group Date first assessed: 10/03/18 [TV] Time first assessed: 1520 [TV] Location: abdomen [TV] Type: incision [TV] Recorded by:  [TV] Minda Charlton RN 10/03/18 1520    Row Name                Wound 10/03/18 1815 Left lower arm skin tear    Wound - Properties Group Date first assessed: 10/03/18 [SR] Time first assessed: 1815 [SR] Present On Admission : yes [SR] Side: Left [SR] Orientation: lower [SR] Location: arm [SR] Type: skin tear [SR] Recorded by:  [SR] Bharti Mendez RN 10/03/18 1816    Row Name 10/08/18 1300 10/08/18 1103          Coping    Observed Emotional State --  - accepting;cooperative  -     Verbalized Emotional State --  - acceptance  -EH     Recorded by [EH] Chloe Wells, PT 10/08/18 1330 [EH] Chloe Wells, PT 10/08/18 1330     Row Name 10/08/18 1300 10/08/18 1103          Outcome Summary/Treatment Plan (PT)    Daily Summary of Progress (PT) --  - progress towards functional goals is fair  -     Barriers to Overall Progress (PT) --  - pt set in her ways, reluctant to attempt new things/therapy outside of hospital  -EH     Recorded by [EH] Chloe Wells, PT 10/08/18 1330 [EH] Chloe Wells, PT 10/08/18 1330       User Key  (r) = Recorded By, (t) = Taken By, (c) = Cosigned By    Initials Name Effective Dates Discipline     Chloe Wells, PT 06/19/15 -  PT    TV Minda Charlton RN 06/16/16 -  Nurse    SR Bharti Mendez RN 06/12/17 -  Nurse          Wound 10/03/18 1520 abdomen incision (Active)   Dressing Appearance dry;intact 10/8/2018 10:00 AM   Closure None 10/8/2018 10:00 AM   Base red/granulating 10/8/2018 10:00 AM   Periwound pink 10/8/2018 10:00 AM   Periwound Temperature warm 10/8/2018 10:00 AM   Periwound Skin Turgor soft 10/8/2018 10:00 AM   Drainage Characteristics/Odor purulent 10/7/2018  9:08 PM   Drainage Amount none 10/8/2018 10:00 AM   Care, Wound antimicrobial agent applied 10/8/2018 10:00 AM   Dressing Care, Wound  dressing changed;foam;low-adherent 10/8/2018 10:00 AM       Wound 10/03/18 1815 Left lower arm skin tear (Active)   Dressing Appearance dry;intact 10/8/2018 10:00 AM   Closure None 10/8/2018 10:00 AM   Base red/granulating 10/8/2018 10:00 AM   Edges irregular 10/8/2018 10:00 AM   Drainage Amount none 10/8/2018 10:00 AM   Care, Wound antimicrobial agent applied 10/8/2018 10:00 AM   Dressing Care, Wound petroleum-based;dressing changed;multi-layer wrap 10/8/2018 10:00 AM             Physical Therapy Education     Title: PT OT SLP Therapies (Active)     Topic: Physical Therapy (Active)     Point: Mobility training (Done)    Learning Progress Summary     Learner Status Readiness Method Response Comment Documented by    Patient Done Acceptance E,TB VU education on safety, uses of AD, options for OPPT, HHPT etc. Pt declined attempting AD other than rolling her w/c and declines HHPT/OPPT even if goals include getting up from floor, which she expressed concern about.  10/08/18 1330          Point: Body mechanics (Done)    Learning Progress Summary     Learner Status Readiness Method Response Comment Documented by    Patient Done Acceptance E,TB VU education on safety, uses of AD, options for OPPT, HHPT etc. Pt declined attempting AD other than rolling her w/c and declines HHPT/OPPT even if goals include getting up from floor, which she expressed concern about.  10/08/18 1330          Point: Precautions (Done)    Learning Progress Summary     Learner Status Readiness Method Response Comment Documented by    Patient Done Acceptance E,TB VU education on safety, uses of AD, options for OPPT, HHPT etc. Pt declined attempting AD other than rolling her w/c and declines HHPT/OPPT even if goals include getting up from floor, which she expressed concern about.  10/08/18 1330                      User Key     Initials Effective Dates Name Provider Type Discipline     06/19/15 -  Chloe Wells, PT Physical Therapist PT                     PT Recommendation and Plan  Therapy Frequency (PT Clinical Impression): daily  Outcome Summary/Treatment Plan (PT)  Daily Summary of Progress (PT): progress towards functional goals is fair  Barriers to Overall Progress (PT): pt set in her ways, reluctant to attempt new things/therapy outside of hospital  Plan of Care Reviewed With: patient  Outcome Summary: Pt appears to be nearing baseline mobility, ambulating in blanc while holding to w/c (as she does for long distances). Pt declines trial of AD, stating she has them in a corner at home, and declines OPPT/HHPT options despite discussing goals they can help her complete. Pt anticipating home soon.          Outcome Measures     Row Name 10/08/18 1103             How much help from another person do you currently need...    Turning from your back to your side while in flat bed without using bedrails? 4  -EH      Moving from lying on back to sitting on the side of a flat bed without bedrails? 3  -EH      Moving to and from a bed to a chair (including a wheelchair)? 3  -EH      Standing up from a chair using your arms (e.g., wheelchair, bedside chair)? 3  -EH      Climbing 3-5 steps with a railing? 3  -EH      To walk in hospital room? 3  -EH      AM-PAC 6 Clicks Score 19  -         Functional Assessment    Outcome Measure Options AM-PAC 6 Clicks Basic Mobility (PT)  -        User Key  (r) = Recorded By, (t) = Taken By, (c) = Cosigned By    Initials Name Provider Type     Chloe Wells, PT Physical Therapist           Time Calculation:         PT Charges     Row Name 10/08/18 1300 10/08/18 1103          Time Calculation    Start Time  -- 1103  -     PT Received On  -- 10/08/18  [facility]     PT Goal Re-Cert Due Date  -- 10/14/18  -        Time Calculation- PT    Total Timed Code Minutes- PT  -- 30 minute(s)  -        Timed Charges    57181 - PT Therapeutic Activity Minutes --  - 30  -       User Key  (r) = Recorded By, (t) = Taken By,  (c) = Cosigned By    Initials Name Provider Type     Chloe Wells, PT Physical Therapist        Therapy Suggested Charges     Code   Minutes Charges    18358 (CPT®) Hc Pt Neuromusc Re Education Ea 15 Min      96191 (CPT®) Hc Pt Ther Proc Ea 15 Min      47191 (CPT®) Hc Gait Training Ea 15 Min      13835 (CPT®) Hc Pt Therapeutic Act Ea 15 Min 30 2    60303 (CPT®) Hc Pt Manual Therapy Ea 15 Min      84727 (CPT®) Hc Pt Iontophoresis Ea 15 Min      05048 (CPT®) Hc Pt Elec Stim Ea-Per 15 Min      69631 (CPT®) Hc Pt Ultrasound Ea 15 Min      59221 (CPT®) Hc Pt Self Care/Mgmt/Train Ea 15 Min      07210 (CPT®) Hc Pt Prosthetic (S) Train Initial Encounter, Each 15 Min      01767 (CPT®) Hc Pt Orthotic(S)/Prosthetic(S) Encounter, Each 15 Min      74823 (CPT®) Hc Orthotic(S) Mgmt/Train Initial Encounter, Each 15min      Total  30 2        Therapy Charges for Today     Code Description Service Date Service Provider Modifiers Qty    [id number] HC PT THERAPEUTIC ACT EA 15 MIN 10/8/2018 Chloe Wells, PT GP 2          PT G-Codes  Outcome Measure Options: AM-PAC 6 Clicks Basic Mobility (PT)  AM-PAC 6 Clicks Score: 19  Functional Limitation: Mobility: Walking and moving around  Mobility: Walking and Moving Around Current Status (): At least 20 percent but less than 40 percent impaired, limited or restricted  Mobility: Walking and Moving Around Goal Status (): At least 1 percent but less than 20 percent impaired, limited or restricted    Chloe Wells, PT  10/8/2018

## 2018-10-08 NOTE — PROGRESS NOTES
"   LOS: 0 days    Patient Care Team:  Provider, No Known as PCP - General    Chief Complaint:  PD catheter dysfunction.    Subjective   No acute events overnight. No new complaints.   Review of Systems:   No CP or SOA    Objective     Vital Sign Min/Max for last 24 hours  Temp  Min: 97.5 °F (36.4 °C)  Max: 98.1 °F (36.7 °C)   BP  Min: 147/92  Max: 174/67   Pulse  Min: 66  Max: 86   Resp  Min: 16  Max: 18   SpO2  Min: 93 %  Max: 100 %   No Data Recorded   No Data Recorded     Flowsheet Rows      First Filed Value   Admission Height  160 cm (63\") Documented at 10/03/2018 1058   Admission Weight  91.6 kg (202 lb) Documented at 10/03/2018 1058          I/O this shift:  In: 240 [P.O.:240]  Out: -   I/O last 3 completed shifts:  In: 680 [P.O.:480; IV Piggyback:200]  Out: 2350 [Urine:2350]    Physical Exam:     General Appearance:    Alert, cooperative, in no acute distress   Head:    Normocephalic, without obvious abnormality, atraumatic               Neck:   No adenopathy, supple, trachea midline, no thyromegaly, no     carotid bruit, no JVD       Lungs:     Clear to auscultation,respirations regular, even and                   unlabored    Heart:    Regular rhythm and normal rate, normal S1 and S2, no            murmur, no gallop, no rub, no click       Abdomen:     Normal bowel sounds, no masses, no organomegaly, soft        non-tender, non-distended, no guarding, no rebound                 tenderness       Extremities:   Moves all extremities well, no edema, no cyanosis, no              redness               Neurologic:   Cranial nerves 2 - 12 grossly intact, sensation intact, DTR        present and equal bilaterally       WBC WBC   Date Value Ref Range Status   10/08/2018 6.54 3.50 - 10.80 10*3/mm3 Final      HGB Hemoglobin   Date Value Ref Range Status   10/08/2018 9.6 (L) 11.5 - 15.5 g/dL Final      HCT Hematocrit   Date Value Ref Range Status   10/08/2018 29.7 (L) 34.5 - 44.0 % Final      Platlets No results found " for: LABPLAT   MCV MCV   Date Value Ref Range Status   10/08/2018 94.0 80.0 - 99.0 fL Final          Sodium Sodium   Date Value Ref Range Status   10/08/2018 138 132 - 146 mmol/L Final      Potassium Potassium   Date Value Ref Range Status   10/08/2018 4.0 3.5 - 5.5 mmol/L Final      Chloride Chloride   Date Value Ref Range Status   10/08/2018 109 99 - 109 mmol/L Final      CO2 CO2   Date Value Ref Range Status   10/08/2018 20.0 20.0 - 31.0 mmol/L Final      BUN BUN   Date Value Ref Range Status   10/08/2018 68 (H) 9 - 23 mg/dL Final      Creatinine Creatinine   Date Value Ref Range Status   10/08/2018 3.55 (H) 0.60 - 1.30 mg/dL Final      Calcium Calcium   Date Value Ref Range Status   10/08/2018 8.5 (L) 8.7 - 10.4 mg/dL Final      PO4 No results found for: CAPO4   Albumin Albumin   Date Value Ref Range Status   10/08/2018 2.88 (L) 3.20 - 4.80 g/dL Final      Magnesium No results found for: MG   Uric Acid No results found for: URICACID        Results Review:     I reviewed the patient's new clinical results.      amLODIPine 10 mg Oral Daily   aspirin 81 mg Oral Daily   DAPTOmycin 6 mg/kg Intravenous Q48H   docusate sodium 100 mg Oral Daily   famotidine 20 mg Oral Daily   heparin (porcine) 5,000 Units Subcutaneous Q8H   hydrochlorothiazide 25 mg Oral Daily   hydroxychloroquine 200 mg Oral BID   insulin lispro 0-7 Units Subcutaneous 4x Daily With Meals & Nightly   povidone-iodine  Topical Q12H   terazosin 5 mg Oral Nightly   vitamin D3 5,000 Units Oral Daily          Medication Review:     Assessment/Plan     Principal Problem:    Peritonitis due to infected peritoneal dialysis catheter (CMS/HCC)  Active Problems:    Infection    Diabetic nephropathies (CMS/HCC)    HTN (hypertension)    Stage 4 chronic kidney disease (CMS/HCC)    Type 2 diabetes mellitus (CMS/HCC)    Anemia of chronic disease    Morbid obesity (CMS/HCC)    Venous stasis    1- CKD stage V - no signs and symptoms of uremia. Volume status - stable.   2-  Peritonitis due to PD catheter.   3- HTN   4- Anemia of chronic disease.      Plan:  - Continue with antibx, follow culture.   - Monitor I/o   - IV iron   - Avoid nephrotoxic agents.   - renal diet  - Adjust meds per renal fucntion   - no emergent need of dialysis. Will closely follow for such needs.      I discussed the patients findings and my recommendations with patient and nursing staff    Sandra Vance MD  10/08/18  11:26 AM

## 2018-10-08 NOTE — PLAN OF CARE
Problem: Patient Care Overview  Goal: Plan of Care Review  Outcome: Ongoing (interventions implemented as appropriate)   10/08/18 6545   Coping/Psychosocial   Plan of Care Reviewed With patient   OTHER   Outcome Summary Pt appears to be nearing baseline mobility, ambulating in blanc while holding to w/c (as she does for long distances). Pt declines trial of AD, stating she has them in a corner at home, and declines OPPT/HHPT options despite discussing goals they can help her complete. Pt anticipating home soon.

## 2018-10-08 NOTE — CONSULTS
[facility] Medicine Services  CONSULT NOTE      Patient Name: Cleo Hobbs  : 1940  MRN: [id number]    Primary Care Physician: Provider, No Known  Referring Provider: Gonzalez Sauceda,*    Subjective   Subjective     Reason for Consultation:  Medical mgmt    HPI:  Cleo Hobbs is a 78 y.o. female admitted 10/3 by Gen Surgery due to difficulty flushing her peritoneal dialysis catheter.  (Fluid infused but no return was appreciated.)  She was admitted and underwent catheter repositioning on 10/3.  Intraoperatively, pus was expressed from the cath site and was visible in the catheter itself.      She has been on aztreonam/dapto since 10/4, narrowed to dapto only once Corynebacterium was isolated from wound culture and original catheter. However, the sensitivities were sent off to  and result is pending.     She feels well aside from some abdominal soreness.  She has not been taking pain meds.  She is very eager to get home.        Review of Systems   Gen- No fevers, chills  CV- No chest pain, palpitations  Resp- No cough, dyspnea  GI- No N/V/D, abd pain    Otherwise 10-system ROS reviewed and is negative except as mentioned in the HPI.      Past Medical History:   Diagnosis Date   • Anemia    • Arthritis    • CKD (chronic kidney disease) stage 4, GFR 15-29 ml/min (CMS/[facility])    • Diabetes mellitus (CMS/[facility])    • Fibromyalgia    • Heart murmur    • Hypertension    • Sjoegren syndrome (CMS/HCC)        Past Surgical History:   Procedure Laterality Date   • ANKLE FUSION     • CATARACT EXTRACTION     • HYSTERECTOMY     • INSERTION PERITONEAL DIALYSIS CATHETER N/A 2018    Procedure: LAPAROSCOPIC PERITONEAL DIALYSIS CATHETER PLACEMENT;  Surgeon: Gonzalez Sauceda MD;  Location: [facility];  Service: General   • INSERTION PERITONEAL DIALYSIS CATHETER N/A 10/3/2018    Procedure: INSERTION PERITONEAL DIALYSIS CATHETER REMOVAL;  Surgeon: Gonzalez Sauceda MD;  Location:  " [facility];  Service: General       Family History: family history is not on file.     Social History:  reports that she has quit smoking. She has never used smokeless tobacco. She reports that she does not drink alcohol or use drugs.    Medications:  Prescriptions Prior to Admission   Medication Sig Dispense Refill Last Dose   • amLODIPine (NORVASC) 10 MG tablet Take 10 mg by mouth Daily.   10/2/2018 at Unknown time   • aspirin 81 MG EC tablet Take 81 mg by mouth Daily.   10/3/2018 at Unknown time   • Cholecalciferol (VITAMIN D3) 5000 units capsule capsule Take 5,000 Units by mouth Daily.   10/2/2018 at Unknown time   • doxazosin (CARDURA) 4 MG tablet Take 4 mg by mouth Every Night.   10/3/2018 at Unknown time   • estrogens, conjugated, (PREMARIN) 0.625 MG tablet Take 0.625 mg by mouth Daily. Take daily for 21 days then do not take for 7 days.   10/2/2018 at Unknown time   • fenofibrate (TRICOR) 145 MG tablet Take 145 mg by mouth Daily.   10/2/2018 at Unknown time   • hydrochlorothiazide (HYDRODIURIL) 25 MG tablet Take 25 mg by mouth Daily.   10/2/2018 at Unknown time   • hydroxychloroquine (PLAQUENIL) 200 MG tablet Take 200 mg by mouth 2 (Two) Times a Day.   10/2/2018 at Unknown time   • insulin regular (humuLIN R,novoLIN R) 100 UNIT/ML injection Inject  under the skin into the appropriate area as directed 3 (Three) Times a Day Before Meals.   10/2/2018 at Unknown time       Allergies   Allergen Reactions   • Penicillins Anaphylaxis   • Levaquin [Levofloxacin] Itching   • Sulfa Antibiotics Unknown (See Comments)     PT STATES IT \"MAKES HER SICK\" hurts her back       Objective   Objective     Vital Signs:   Temp:  [97.5 °F (36.4 °C)-98.1 °F (36.7 °C)] 98.1 °F (36.7 °C)  Heart Rate:  [59-86] 59  Resp:  [16-18] 16  BP: (147-174)/() 162/59     Physical Exam   Constitutional: No acute distress, awake, alert, sitting up on EOB.    Eyes: PERRLA, sclerae anicteric, no conjunctival injection  HENT: NCAT, mucous " membranes moist  Neck: Supple,  trachea midline  Respiratory: Clear to auscultation bilaterally, nonlabored respirations   Cardiovascular: RRR, no murmurs, rubs, or gallops, palpable pedal pulses bilaterally  Gastrointestinal: Positive bowel sounds, soft, , nondistended, mildly tender.,  Musculoskeletal: No bilateral ankle edema, no clubbing or cyanosis to extremities  Psychiatric: Appropriate affect, cooperative  Neurologic: Oriented x 3, strength symmetric in all extremities, Cranial Nerves grossly intact to confrontation, speech clear  Skin: No rashes    Results Reviewed:  I have personally reviewed current lab, radiology, and data and agree.      Results from last 7 days  Lab Units 10/08/18  0934   WBC 10*3/mm3 6.54   HEMOGLOBIN g/dL 9.6*   HEMATOCRIT % 29.7*   PLATELETS 10*3/mm3 272       Results from last 7 days  Lab Units 10/08/18  0934   SODIUM mmol/L 138   POTASSIUM mmol/L 4.0   CHLORIDE mmol/L 109   CO2 mmol/L 20.0   BUN mg/dL 68*   CREATININE mg/dL 3.55*   GLUCOSE mg/dL 133*   CALCIUM mg/dL 8.5*   ALT (SGPT) U/L 58*   AST (SGOT) U/L 139*     Estimated Creatinine Clearance: 13.6 mL/min (A) (by C-G formula based on SCr of 3.55 mg/dL (H)).  Brief Urine Lab Results     None        No results found for: BNP    Microbiology Results Abnormal     Procedure Component Value - Date/Time    Anaerobic Culture - Swab, Abdominal Wall [[id number]]  (Normal) Collected:  10/03/18 1511    Lab Status:  Preliminary result Specimen:  Swab from Abdominal Wall Updated:  10/05/18 1453     Culture No anaerobes isolated    Anaerobic Culture - Swab, Abdominal Wall [[id number]]  (Normal) Collected:  10/03/18 1513    Lab Status:  Preliminary result Specimen:  Swab from Abdominal Wall Updated:  10/05/18 1449     Culture No anaerobes isolated    Wound Culture - Surgical Site, Abdominal Wall [[id number]]  (Abnormal) Collected:  10/03/18 1511    Lab Status:  Preliminary result Specimen:  Surgical Site from Abdominal Wall Updated:  10/05/18  1441     Wound Culture Moderate growth (3+) Corynebacterium species (A)     Gram Stain Result Occasional WBCs seen      Moderate (3+) Gram positive bacilli    Hardware / Foreign Body Culture - Hardware / Foreign Body, Peritoneal Catheter [[id number]]  (Abnormal) Collected:  10/03/18 1513    Lab Status:  Final result Specimen:  Hardware / Foreign Body from Peritoneal Catheter Updated:  10/05/18 1346     Hardware / Foreign Body Culture Moderate growth (3+) Corynebacterium species (A)     Comment: Presumptively identified.          Gram Stain Result Moderate (3+) WBCs seen      Occasional Gram positive bacilli    AFB Culture - Swab, Abdominal Wall [[id number]] Collected:  10/03/18 1511    Lab Status:  Preliminary result Specimen:  Swab from Abdominal Wall Updated:  10/04/18 1246     AFB Stain No acid fast bacilli seen on concentrated smear    AFB Culture - Swab, Abdominal Wall [[id number]] Collected:  10/03/18 1513    Lab Status:  Preliminary result Specimen:  Swab from Abdominal Wall Updated:  10/04/18 1246     AFB Stain No acid fast bacilli seen on concentrated smear          Imaging Results (last 24 hours)     ** No results found for the last 24 hours. **             Assessment/Plan   Assessment / Plan     Hospital Problem List     * (Principal)Peritonitis due to infected peritoneal dialysis catheter (CMS/HCC)    Infection    Diabetic nephropathies (CMS/[facility])    HTN (hypertension)    Stage 4 chronic kidney disease (CMS/[facility])    Type 2 diabetes mellitus (CMS/[facility])    Anemia of chronic disease    Morbid obesity (CMS/[facility])    Venous stasis            Plan:    Peritonitis, cath-associated, corynebacterium  - Day 5 dapto, sensit pending ()  - plan for 2-3 week treatment  - ID and nephro following    CKD V  - stable, no need for dialysis currently   - cath replaced.    HTN  Fe def anemia - getting IV iron  A1c of 6.1, uses sliding scale regular insulin at home.    Thank you for allowing [facility] Medicine Service to  provide consultative care for your patient, we will continue to follow while clinically appropriate.    Electronically signed by Cydney Felix MD, 10/08/18, 1:03 PM.

## 2018-10-08 NOTE — PROGRESS NOTES
Continued Stay Note  [facility]     Patient Name: Cleo Hobbs  MRN: [id number]  Today's Date: 10/8/2018    Admit Date: 10/3/2018          Discharge Plan     Row Name 10/08/18 1341       Plan    Plan home with outpatient IV antibiotics    Patient/Family in Agreement with Plan yes    Plan Comments I spoke with Mrs. Hobbs at the bedside about her discharge plan. Per Infectious Disease's last note, she will likely need IV Daptomycin after this hospitalization. She states that she does not want to do these infusions at home, but is planning on driving here daily to Infectious Disease's office until she can possibly transition to oral antibiotics. She states that she discussed this with Dr. Adame on Friday. She is hopeful that she can be discharged home today. She denies having any needs for DME or home health services. She states that her  can transport her home by car.    Final Discharge Disposition Code 01 - home or self-care              Discharge Codes    No documentation.           Clayton Neri

## 2018-10-09 ENCOUNTER — READMISSION MANAGEMENT (OUTPATIENT)
Dept: CALL CENTER | Facility: HOSPITAL | Age: 78
End: 2018-10-09

## 2018-10-09 NOTE — OUTREACH NOTE
Prep Survey      Responses   Facility patient discharged from?  [place]   Is patient eligible?  Yes   Discharge diagnosis  Peritonitis r/t infected periotneal dialysis cath, sp replacement of perioteneal cath, DM2, HTN CKD stage 4, anemia   Does the patient have one of the following disease processes/diagnoses(primary or secondary)?  General Surgery   Does the patient have Home health ordered?  No   Is there a DME ordered?  No   Comments regarding appointments  Labs to be drawn 8/11   Prep survey completed?  Yes          Jaelyn Franco RN

## 2018-10-10 ENCOUNTER — READMISSION MANAGEMENT (OUTPATIENT)
Dept: CALL CENTER | Facility: HOSPITAL | Age: 78
End: 2018-10-10

## 2018-10-10 LAB
BACTERIA SPEC ANAEROBE CULT: NORMAL
BACTERIA SPEC ANAEROBE CULT: NORMAL

## 2018-10-10 NOTE — OUTREACH NOTE
General Surgery Week 1 Survey      Responses   Facility patient discharged from?  [place]   Does the patient have one of the following disease processes/diagnoses(primary or secondary)?  General Surgery   Is there a successful TCM telephone encounter documented?  No   Week 1 attempt successful?  Yes   Call start time  1722   Call end time  1731   Discharge diagnosis  Peritonitis r/t infected periotneal dialysis cath, sp replacement of perioteneal cath, DM2, HTN CKD stage 4, anemia   Meds reviewed with patient/caregiver?  Yes   Is the patient having any side effects they believe may be caused by any medication additions or changes?  No   Does the patient have all medications related to this admission filled (includes all antibiotics, pain medications, etc.)  Yes   Is the patient taking all medications as directed (includes completed medication regime)?  Yes   Does the patient have a follow up appointment scheduled with their surgeon?  Yes   What is preventing the patient from scheduling follow up appointments?  Waiting on return call   Has the patient kept scheduled appointments due by today?  N/A   Comments  pt states had a fall today, had EMT to  pt off floor, but no injuries   Did the patient receive a copy of their discharge instructions?  Yes   Nursing interventions  Reviewed instructions with patient   What is the patient's perception of their health status since discharge?  Improving   Nursing interventions  Nurse provided patient education   Is the patient /caregiver able to teach back basic post-op care?  Drive as instructed by MD in discharge instructions, Take showers only when approved by MD-sponge bathe until then, No tub bath, swimming, or hot tub until instructed by MD, Keep incision areas clean,dry and protected, Lifting as instructed by MD in discharge instructions   Is the patient/caregiver able to teach back signs and symptoms of incisional infection?  Increased redness, swelling or pain  at the incisonal site, Increased drainage or bleeding, Incisional warmth, Pus or odor from incision, Fever   Is the patient/caregiver able to teach back steps to recovery at home?  Set small, achievable goals for return to baseline health, Rest and rebuild strength, gradually increase activity   Is the patient/caregiver able to teach back the hierarchy of who to call/visit for symptoms/problems? PCP, Specialist, Home health nurse, Urgent Care, ED, 911  Yes   Week 1 call completed?  Yes          Vanessa Thompson RN

## 2018-10-11 ENCOUNTER — RESULTS ENCOUNTER (OUTPATIENT)
Dept: TELEMETRY | Facility: HOSPITAL | Age: 78
End: 2018-10-11

## 2018-10-11 DIAGNOSIS — K65.9 PERITONITIS DUE TO INFECTED PERITONEAL DIALYSIS CATHETER, SUBSEQUENT ENCOUNTER (HCC): ICD-10-CM

## 2018-10-11 DIAGNOSIS — T85.71XD PERITONITIS DUE TO INFECTED PERITONEAL DIALYSIS CATHETER, SUBSEQUENT ENCOUNTER (HCC): ICD-10-CM

## 2018-10-18 ENCOUNTER — READMISSION MANAGEMENT (OUTPATIENT)
Dept: CALL CENTER | Facility: HOSPITAL | Age: 78
End: 2018-10-18

## 2018-10-18 ENCOUNTER — LAB REQUISITION (OUTPATIENT)
Dept: LAB | Facility: HOSPITAL | Age: 78
End: 2018-10-18

## 2018-10-18 DIAGNOSIS — Z00.00 ROUTINE GENERAL MEDICAL EXAMINATION AT A HEALTH CARE FACILITY: ICD-10-CM

## 2018-10-18 LAB
ALBUMIN SERPL-MCNC: 3.45 G/DL (ref 3.2–4.8)
ALBUMIN/GLOB SERPL: 1.2 G/DL (ref 1.5–2.5)
ALP SERPL-CCNC: 45 U/L (ref 25–100)
ALT SERPL W P-5'-P-CCNC: 51 U/L (ref 7–40)
ANION GAP SERPL CALCULATED.3IONS-SCNC: 12 MMOL/L (ref 3–11)
AST SERPL-CCNC: 50 U/L (ref 0–33)
BASOPHILS # BLD AUTO: 0.02 10*3/MM3 (ref 0–0.2)
BASOPHILS NFR BLD AUTO: 0.2 % (ref 0–1)
BILIRUB SERPL-MCNC: 0.4 MG/DL (ref 0.3–1.2)
BUN BLD-MCNC: 68 MG/DL (ref 9–23)
BUN/CREAT SERPL: 19.5 (ref 7–25)
CALCIUM SPEC-SCNC: 8.8 MG/DL (ref 8.7–10.4)
CHLORIDE SERPL-SCNC: 105 MMOL/L (ref 99–109)
CK SERPL-CCNC: 117 U/L (ref 26–174)
CO2 SERPL-SCNC: 16 MMOL/L (ref 20–31)
CREAT BLD-MCNC: 3.49 MG/DL (ref 0.6–1.3)
CRP SERPL-MCNC: 1.79 MG/DL (ref 0–1)
DEPRECATED RDW RBC AUTO: 44.2 FL (ref 37–54)
EOSINOPHIL # BLD AUTO: 0 10*3/MM3 (ref 0–0.3)
EOSINOPHIL NFR BLD AUTO: 0 % (ref 0–3)
ERYTHROCYTE [DISTWIDTH] IN BLOOD BY AUTOMATED COUNT: 13.2 % (ref 11.3–14.5)
ERYTHROCYTE [SEDIMENTATION RATE] IN BLOOD: 62 MM/HR (ref 0–30)
GFR SERPL CREATININE-BSD FRML MDRD: 13 ML/MIN/1.73
GLOBULIN UR ELPH-MCNC: 3 GM/DL
GLUCOSE BLD-MCNC: 115 MG/DL (ref 70–100)
HCT VFR BLD AUTO: 31.5 % (ref 34.5–44)
HGB BLD-MCNC: 10.4 G/DL (ref 11.5–15.5)
IMM GRANULOCYTES # BLD: 0.01 10*3/MM3 (ref 0–0.03)
IMM GRANULOCYTES NFR BLD: 0.1 % (ref 0–0.6)
LYMPHOCYTES # BLD AUTO: 0.66 10*3/MM3 (ref 0.6–4.8)
LYMPHOCYTES NFR BLD AUTO: 8.1 % (ref 24–44)
MCH RBC QN AUTO: 30.6 PG (ref 27–31)
MCHC RBC AUTO-ENTMCNC: 33 G/DL (ref 32–36)
MCV RBC AUTO: 92.6 FL (ref 80–99)
MONOCYTES # BLD AUTO: 0.22 10*3/MM3 (ref 0–1)
MONOCYTES NFR BLD AUTO: 2.7 % (ref 0–12)
NEUTROPHILS # BLD AUTO: 7.22 10*3/MM3 (ref 1.5–8.3)
NEUTROPHILS NFR BLD AUTO: 89 % (ref 41–71)
PLATELET # BLD AUTO: 239 10*3/MM3 (ref 150–450)
PMV BLD AUTO: 9.8 FL (ref 6–12)
POTASSIUM BLD-SCNC: 3.7 MMOL/L (ref 3.5–5.5)
PROT SERPL-MCNC: 6.4 G/DL (ref 5.7–8.2)
RBC # BLD AUTO: 3.4 10*6/MM3 (ref 3.89–5.14)
SODIUM BLD-SCNC: 133 MMOL/L (ref 132–146)
WBC NRBC COR # BLD: 8.12 10*3/MM3 (ref 3.5–10.8)

## 2018-10-18 PROCEDURE — 80053 COMPREHEN METABOLIC PANEL: CPT | Performed by: INTERNAL MEDICINE

## 2018-10-18 PROCEDURE — 82550 ASSAY OF CK (CPK): CPT | Performed by: INTERNAL MEDICINE

## 2018-10-18 PROCEDURE — 85025 COMPLETE CBC W/AUTO DIFF WBC: CPT | Performed by: INTERNAL MEDICINE

## 2018-10-18 PROCEDURE — 85652 RBC SED RATE AUTOMATED: CPT | Performed by: INTERNAL MEDICINE

## 2018-10-18 PROCEDURE — 86140 C-REACTIVE PROTEIN: CPT | Performed by: INTERNAL MEDICINE

## 2018-10-18 NOTE — OUTREACH NOTE
General Surgery Week 2 Survey      Responses   Facility patient discharged from?  [facility]   Does the patient have one of the following disease processes/diagnoses(primary or secondary)?  General Surgery   Week 2 attempt successful?  Yes   Call start time  1013   Call end time  1017   Is patient permission given to speak with other caregiver?  Yes   List who call center can speak with  , Stanley   Person spoke with today (if not patient) and relationship  Stanley   Meds reviewed with patient/caregiver?  Yes   Is the patient taking all medications as directed (includes completed medication regime)?  Yes   Has the patient kept scheduled appointments due by today?  Yes   Comments  Is headed to Dr. Velez. now, pt. very weak,  states, said may be readmitted.   Has home health visited the patient within 72 hours of discharge?  No   Comments  Pt. very weak headed to     What is the patient's perception of their health status since discharge?  Worsening   Additional teach back comments   states, pt.is so very weak, would not be surprised it  did not put her back in hospital   Week 2 call completed?  Yes          Jaylin Danielle RN

## 2018-10-19 LAB
BACTERIA SPEC AEROBE CULT: ABNORMAL
GRAM STN SPEC: ABNORMAL
GRAM STN SPEC: ABNORMAL
REF LAB TEST RESULTS: NORMAL

## 2018-10-21 NOTE — DISCHARGE SUMMARY
[facility] Medicine Services  DISCHARGE SUMMARY    Patient Name: Cleo Hobbs  : 1940  MRN: [id number]    Date of Admission: 10/3/2018  Date of Discharge:  10/8/2018  Primary Care Physician: Provider, No Known    Consults     Date and Time Order Name Status Description    10/3/2018 1710 Inpatient Infectious Diseases Consult Completed     10/3/2018 1710 Inpatient Nephrology Consult Completed     10/3/2018 1553 Inpatient Infectious Diseases Consult Completed     10/3/2018 1553 Inpatient Nephrology Consult Completed         Hospital Course     Presenting Problem:   Infection [B99.9]  Peritonitis due to infected peritoneal dialysis catheter (CMS/HCC) [T85.71XA, K65.9]    Active Hospital Problems    Diagnosis Date Noted   • **Peritonitis due to infected peritoneal dialysis catheter (CMS/HCC) [T85.71XA, K65.9] 10/03/2018   • Infection [B99.9] 10/03/2018   • Diabetic nephropathies (CMS/[facility]) [E11.21] 10/03/2018   • HTN (hypertension) [I10] 10/03/2018   • Stage 4 chronic kidney disease (CMS/[facility]) [N18.4] 10/03/2018   • Type 2 diabetes mellitus (CMS/[facility]) [E11.9] 10/03/2018   • Anemia of chronic disease [D63.8] 10/03/2018   • Morbid obesity (CMS/[facility]) [E66.01] 10/03/2018   • Venous stasis [I87.8] 10/03/2018      Resolved Hospital Problems    Diagnosis Date Noted Date Resolved   No resolved problems to display.          Hospital Course:  Cleo Hobbs is a 78 y.o. female who recently had a peritoneal dialysis catheter placed.  She was referred to the hospital when her home health nurse was unable to get return after infusing fluid.      She went to the OR and the cathether was removed.  Pus was expressed from the wound.  Culture grew corynebacterium. ID service was consulted.  She received 5 days of antibiotics ( daptomycin).  Sensitivities were sent to [facility] but were pending at the time of discharge.   Plan is for 2-3 weeks of treatment, with Dr. Adame of ID to followup. She was changed to  linezolid for ease of oral dosing.     Nephrologist followed her.  Renal function remained stable though CKD V.      For her Fe def anemia she got IV iron.  Her A1c was  6.1.     Discharge Follow Up Recommendations for labs/diagnostics:  ID to follow up on corynebacterium sensistivities at .       Day of Discharge     HPI:   Feels fine aside from pain at surgical site. Eager to get home to  and pets.    Review of Systems   Gen- No fevers, chills  CV- No chest pain, palpitations  Resp- No cough, dyspnea  GI- No N/V/D,    Otherwise ROS is negative except as mentioned in the HPI.    Vital Signs:         Physical Exam:  Gen:  WD/WN woman up on EOB, moves easily, chatty and pleasant.   Neuro: alert and oriented, clear speech, follows commands, grossly nonfocal  HEENT:  NC/AT PERRL, OP benign  Neck:  Supple, no LAD  Heart RRR no murmur, rub, or gallop  Abd:  Soft,  no rebound or guarding, pos BS, mildly tender.   Extrem:  No c/c/e      Pertinent  and/or Most Recent Results       Results from last 7 days  Lab Units 10/18/18  1427   WBC 10*3/mm3 8.12   HEMOGLOBIN g/dL 10.4*   HEMATOCRIT % 31.5*   PLATELETS 10*3/mm3 239   SODIUM mmol/L 133   POTASSIUM mmol/L 3.7   CHLORIDE mmol/L 105   CO2 mmol/L 16.0*   BUN mg/dL 68*   CREATININE mg/dL 3.49*   GLUCOSE mg/dL 115*   CALCIUM mg/dL 8.8       Results from last 7 days  Lab Units 10/18/18  1427   BILIRUBIN mg/dL 0.4   ALK PHOS U/L 45   ALT (SGPT) U/L 51*   AST (SGOT) U/L 50*           Invalid input(s): TG, LDLCALC, LDLREALC      Brief Urine Lab Results     None          Microbiology Results Abnormal     Procedure Component Value - Date/Time    Wound Culture - Surgical Site, Abdominal Wall [[id number]]  (Abnormal) Collected:  10/03/18 1511    Lab Status:  Edited Result - FINAL Specimen:  Surgical Site from Abdominal Wall Updated:  10/19/18 1342     Wound Culture Moderate growth (3+) Corynebacterium striatum (A)     Comment: See scanned report          Gram Stain Result  Occasional WBCs seen      Moderate (3+) Gram positive bacilli    Narrative:       Sent to reference lab for testing.    Fungus Culture - Swab, Abdominal Wall [[id number]] Collected:  10/03/18 1511    Lab Status:  Preliminary result Specimen:  Swab from Abdominal Wall Updated:  10/17/18 1715     Fungus Culture No fungus isolated at 2 weeks    AFB Culture - Swab, Abdominal Wall [[id number]]  (Normal) Collected:  10/03/18 1511    Lab Status:  Preliminary result Specimen:  Swab from Abdominal Wall Updated:  10/17/18 1715     AFB Culture No AFB isolated at 2 weeks     AFB Stain No acid fast bacilli seen on concentrated smear    Fungus Culture - Swab, Abdominal Wall [[id number]] Collected:  10/03/18 1513    Lab Status:  Preliminary result Specimen:  Swab from Abdominal Wall Updated:  10/17/18 1715     Fungus Culture No fungus isolated at 2 weeks    AFB Culture - Swab, Abdominal Wall [[id number]]  (Normal) Collected:  10/03/18 1513    Lab Status:  Preliminary result Specimen:  Swab from Abdominal Wall Updated:  10/17/18 1715     AFB Culture No AFB isolated at 2 weeks     AFB Stain No acid fast bacilli seen on concentrated smear    Anaerobic Culture - Swab, Abdominal Wall [[id number]]  (Normal) Collected:  10/03/18 1511    Lab Status:  Final result Specimen:  Swab from Abdominal Wall Updated:  10/10/18 1450     Culture No anaerobes isolated    Anaerobic Culture - Swab, Abdominal Wall [[id number]]  (Normal) Collected:  10/03/18 1513    Lab Status:  Final result Specimen:  Swab from Abdominal Wall Updated:  10/10/18 1449     Culture No anaerobes isolated    Hardware / Foreign Body Culture - Hardware / Foreign Body, Peritoneal Catheter [[id number]]  (Abnormal) Collected:  10/03/18 1513    Lab Status:  Final result Specimen:  Hardware / Foreign Body from Peritoneal Catheter Updated:  10/05/18 1346     Hardware / Foreign Body Culture Moderate growth (3+) Corynebacterium species (A)     Comment: Presumptively identified.          Gram  Stain Result Moderate (3+) WBCs seen      Occasional Gram positive bacilli          Imaging Results (all)     None                          Order Current Status    AFB Culture - Swab, Abdominal Wall Preliminary result    AFB Culture - Swab, Abdominal Wall Preliminary result    Fungus Culture - Swab, Abdominal Wall Preliminary result    Fungus Culture - Swab, Abdominal Wall Preliminary result        Discharge Details        Discharge Medications      New Medications      Instructions Start Date   linezolid 600 MG tablet  Commonly known as:  ZYVOX   600 mg, Oral, 2 Times Daily         Continue These Medications      Instructions Start Date   amLODIPine 10 MG tablet  Commonly known as:  NORVASC   10 mg, Oral, Daily      aspirin 81 MG EC tablet   81 mg, Oral, Daily      doxazosin 4 MG tablet  Commonly known as:  CARDURA   4 mg, Oral, Nightly      estrogens (conjugated) 0.625 MG tablet  Commonly known as:  PREMARIN   0.625 mg, Oral, Daily, Take daily for 21 days then do not take for 7 days.       fenofibrate 145 MG tablet  Commonly known as:  TRICOR   145 mg, Oral, Daily      hydrochlorothiazide 25 MG tablet  Commonly known as:  HYDRODIURIL   25 mg, Oral, Daily      hydroxychloroquine 200 MG tablet  Commonly known as:  PLAQUENIL   200 mg, Oral, 2 Times Daily      insulin regular 100 UNIT/ML injection  Commonly known as:  humuLIN R,novoLIN R   Subcutaneous, 3 Times Daily Before Meals      vitamin D3 5000 units capsule capsule   5,000 Units, Oral, Daily               Discharge Disposition:  Home or Self Care    Discharge Diet:  Diet Instructions     Diet: Regular, Consistent Carbohydrate, Renal       Discharge Diet:   Regular  Consistent Carbohydrate  Renal             Discharge Activity:   Activity Instructions     Activity as Tolerated             Code Status/Level of Support:  Code Status and Medical Interventions:   Ordered at: 10/03/18 3156     Code Status:    CPR     Medical Interventions (Level of Support Prior to  Arrest):    Full       No future appointments.    Additional Instructions for the Follow-ups that You Need to Schedule     Basic Metabolic Panel     Oct 11, 2018 (Approximate)      Faxed to [phone] attn: Dr Adame    Order Comments:  Faxed to [phone] attn: Dr Adame          CBC & Differential     Oct 11, 2018 (Approximate)      Faxed to [phone] attn: Dr Adame    Order Comments:  Faxed to [phone] attn: Dr Adame     Manual Differential:  No         CK     Oct 11, 2018 (Approximate)      Faxed to [phone] attn: Dr Adame    Order Comments:  Faxed to [phone] attn: Dr Adame          Discharge Follow-up with PCP    As directed      Currently Documented PCP:  Provider, No Known  PCP Phone Number:  [phone]    Follow Up Details:  1 week         Discharge Follow-up with Specified Provider: ID next week    As directed      To:  ID next week    Follow Up Details:  either 10/18 or 10/19 per Dr. Billings note         Discharge Follow-up with Specified Provider: nephrology per their recs    As directed      To:  nephrology per their recs               Time Spent on Discharge:  45 minutes    Electronically signed by Cydney Felix MD, 10/21/18, 2:16 PM.

## 2018-10-29 ENCOUNTER — READMISSION MANAGEMENT (OUTPATIENT)
Dept: CALL CENTER | Facility: HOSPITAL | Age: 78
End: 2018-10-29

## 2018-10-29 NOTE — OUTREACH NOTE
General Surgery Week 3 Survey      Responses   Facility patient discharged from?  [place]   Does the patient have one of the following disease processes/diagnoses(primary or secondary)?  General Surgery   Week 3 attempt successful?  No   Unsuccessful attempts  Attempt 1          Argentina Waggoner RN

## 2018-10-30 ENCOUNTER — READMISSION MANAGEMENT (OUTPATIENT)
Dept: CALL CENTER | Facility: HOSPITAL | Age: 78
End: 2018-10-30

## 2018-11-14 LAB
FUNGUS WND CULT: NORMAL
FUNGUS WND CULT: NORMAL
MYCOBACTERIUM SPEC CULT: NORMAL
MYCOBACTERIUM SPEC CULT: NORMAL
NIGHT BLUE STAIN TISS: NORMAL
NIGHT BLUE STAIN TISS: NORMAL

## (undated) DEVICE — ANTIBACTERIAL UNDYED BRAIDED (POLYGLACTIN 910), SYNTHETIC ABSORBABLE SUTURE: Brand: COATED VICRYL

## (undated) DEVICE — MEDI-VAC YANKAUER SUCTION HANDLE W/BULBOUS TIP: Brand: CARDINAL HEALTH

## (undated) DEVICE — SYR CONTRL LUERLOK 10CC

## (undated) DEVICE — DRESSING,OPTIFOAM,NON-ADHESIVE,4X4: Brand: MEDLINE

## (undated) DEVICE — ENCORE® LATEX MICRO SIZE 8, STERILE LATEX POWDER-FREE SURGICAL GLOVE: Brand: ENCORE

## (undated) DEVICE — APPL DURAPREP IODOPHOR APL 26ML

## (undated) DEVICE — THIS ADAPTER IS A DOUBLE SEALING FEMALE LUER LOCK ADAPTER WITH A 2-PIECE, COMBINATION COMPRESSION FIT/BARBED CATHETER CONNECTOR. THE ADAPTER IS USED TO CONNECT THE PD CATHETER TO A SOLUTION TRANSFER SET WITH LOCKING CONNECTOR.: Brand: LOCKING TITANIUM ADAPTER FOR PERITONEAL DIALYSIS CATHETER

## (undated) DEVICE — CANNULA,OXY,ADULT,SUPERSOFT,W/7'TUB,UC: Brand: MEDLINE

## (undated) DEVICE — SYR LUERLOK 30CC

## (undated) DEVICE — COVADERM: Brand: DEROYAL

## (undated) DEVICE — 3M™ STERI-STRIP™ REINFORCED ADHESIVE SKIN CLOSURES, R1547, 1/2 IN X 4 IN (12 MM X 100 MM), 6 STRIPS/ENVELOPE: Brand: 3M™ STERI-STRIP™

## (undated) DEVICE — THIS DEVICE IS A PLASTIC DISCONNECT CAP FOR PERITONEAL DIALYSIS AND CONTAINS POVIDONE-IODINE INTENDED TO PROTECT THE FEMALE LUER CONNECTOR OF THE BAXTER TRANSFER SET.: Brand: MINICAP WITH POVIDONE-IODINE SOLUTION

## (undated) DEVICE — ENDOPATH XCEL BLADELESS TROCARS WITH STABILITY SLEEVES: Brand: ENDOPATH XCEL

## (undated) DEVICE — SUT ETHLN 2/0 PS 18IN 585H

## (undated) DEVICE — THIS SET CONSISTS OF A FEMALE LOCKING CONNECTOR/ON-OFF CLAMP ASSEMBLY, TUBING AND DOUBLE SEALING MALE LUER LOCK CONNECTOR. THIS SET IS TO BE USED WITH THE BAXTER LOCKING TITANIUM ADAPTER FOR PERITONEAL DIALYSIS CATHETER IN DISCONNECT APPLICATIONS AND IN CYCLER APPLICATIONS WHERE ASEPTIC CONNECTIONS AND DISCONNECTIONS ARE PERFORMED AT THE TRANSFER SET/CYCLER SET JUNCTURE.: Brand: MINICAP

## (undated) DEVICE — ENCORE® LATEX MICRO SIZE 7.5, STERILE LATEX POWDER-FREE SURGICAL GLOVE: Brand: ENCORE

## (undated) DEVICE — GOWN,NON-REINFORCED,SIRUS,SET IN SLV,XL: Brand: MEDLINE

## (undated) DEVICE — PK LAP LASR CHOLE 10

## (undated) DEVICE — DEFOGGER!" ANTI FOG KIT: Brand: DEROYAL

## (undated) DEVICE — SUT MNCRYL PLS ANTIB UD 4/0 PS2 18IN

## (undated) DEVICE — DECANT BG O JET

## (undated) DEVICE — [HIGH FLOW HEATED INSUFFLATOR TUBING,  DO NOT USE IF PACKAGE IS DAMAGED]

## (undated) DEVICE — ADHS LIQ MASTISOL 2/3ML

## (undated) DEVICE — 3M™ TEGADERM™ CHG DRESSING 25/CARTON 4 CARTONS/CASE 1658: Brand: TEGADERM™

## (undated) DEVICE — 3M™ TEGADERM™ IV TRANSPARENT FILM DRESSING WITH BORDER 100 BAGS/CARTON 4 CARTONS/CASE 1633: Brand: 3M™ TEGADERM™

## (undated) DEVICE — MEDI-VAC NON-CONDUCTIVE SUCTION TUBING: Brand: CARDINAL HEALTH